# Patient Record
Sex: FEMALE | Race: WHITE | NOT HISPANIC OR LATINO | Employment: FULL TIME | URBAN - METROPOLITAN AREA
[De-identification: names, ages, dates, MRNs, and addresses within clinical notes are randomized per-mention and may not be internally consistent; named-entity substitution may affect disease eponyms.]

---

## 2023-01-23 ENCOUNTER — OFFICE VISIT (OUTPATIENT)
Dept: INTERNAL MEDICINE | Facility: CLINIC | Age: 53
End: 2023-01-23
Payer: COMMERCIAL

## 2023-01-23 VITALS
BODY MASS INDEX: 32.76 KG/M2 | HEART RATE: 80 BPM | TEMPERATURE: 97.5 F | OXYGEN SATURATION: 99 % | RESPIRATION RATE: 16 BRPM | DIASTOLIC BLOOD PRESSURE: 90 MMHG | WEIGHT: 178 LBS | HEIGHT: 62 IN | SYSTOLIC BLOOD PRESSURE: 130 MMHG

## 2023-01-23 DIAGNOSIS — E66.09 CLASS 1 OBESITY DUE TO EXCESS CALORIES WITHOUT SERIOUS COMORBIDITY WITH BODY MASS INDEX (BMI) OF 33.0 TO 33.9 IN ADULT: ICD-10-CM

## 2023-01-23 DIAGNOSIS — Z13.228 SCREENING FOR ENDOCRINE, NUTRITIONAL, METABOLIC AND IMMUNITY DISORDER: ICD-10-CM

## 2023-01-23 DIAGNOSIS — E66.811 CLASS 1 OBESITY DUE TO EXCESS CALORIES WITHOUT SERIOUS COMORBIDITY WITH BODY MASS INDEX (BMI) OF 33.0 TO 33.9 IN ADULT: ICD-10-CM

## 2023-01-23 DIAGNOSIS — N95.1 MENOPAUSAL SYNDROME (HOT FLASHES): ICD-10-CM

## 2023-01-23 DIAGNOSIS — Z13.220 SCREENING FOR HYPERLIPIDEMIA: ICD-10-CM

## 2023-01-23 DIAGNOSIS — Z13.29 SCREENING FOR ENDOCRINE, NUTRITIONAL, METABOLIC AND IMMUNITY DISORDER: ICD-10-CM

## 2023-01-23 DIAGNOSIS — R53.83 FATIGUE, UNSPECIFIED TYPE: ICD-10-CM

## 2023-01-23 DIAGNOSIS — Z76.89 ENCOUNTER TO ESTABLISH CARE: Primary | ICD-10-CM

## 2023-01-23 DIAGNOSIS — Z86.69 HISTORY OF MIGRAINE HEADACHES: ICD-10-CM

## 2023-01-23 DIAGNOSIS — Z80.3 FAMILY HISTORY OF MALIGNANT NEOPLASM OF BREAST: ICD-10-CM

## 2023-01-23 DIAGNOSIS — Z13.21 SCREENING FOR ENDOCRINE, NUTRITIONAL, METABOLIC AND IMMUNITY DISORDER: ICD-10-CM

## 2023-01-23 DIAGNOSIS — Z80.0 FAMILY HISTORY OF COLON CANCER: ICD-10-CM

## 2023-01-23 DIAGNOSIS — Z13.0 SCREENING FOR ENDOCRINE, NUTRITIONAL, METABOLIC AND IMMUNITY DISORDER: ICD-10-CM

## 2023-01-23 DIAGNOSIS — Z12.11 SCREEN FOR COLON CANCER: ICD-10-CM

## 2023-01-23 DIAGNOSIS — Z12.31 VISIT FOR SCREENING MAMMOGRAM: ICD-10-CM

## 2023-01-23 LAB
ALBUMIN SERPL BCG-MCNC: 4.8 G/DL (ref 3.5–5.2)
ALP SERPL-CCNC: 92 U/L (ref 35–104)
ALT SERPL W P-5'-P-CCNC: 33 U/L (ref 10–35)
ANION GAP SERPL CALCULATED.3IONS-SCNC: 18 MMOL/L (ref 7–15)
AST SERPL W P-5'-P-CCNC: 27 U/L (ref 10–35)
BASOPHILS # BLD AUTO: 0.1 10E3/UL (ref 0–0.2)
BASOPHILS NFR BLD AUTO: 1 %
BILIRUB SERPL-MCNC: 0.2 MG/DL
BUN SERPL-MCNC: 8.9 MG/DL (ref 6–20)
CALCIUM SERPL-MCNC: 9.8 MG/DL (ref 8.6–10)
CHLORIDE SERPL-SCNC: 107 MMOL/L (ref 98–107)
CHOLEST SERPL-MCNC: 213 MG/DL
CREAT SERPL-MCNC: 0.67 MG/DL (ref 0.51–0.95)
CRP SERPL-MCNC: <3 MG/L
DEPRECATED CALCIDIOL+CALCIFEROL SERPL-MC: 13 UG/L (ref 20–75)
DEPRECATED HCO3 PLAS-SCNC: 20 MMOL/L (ref 22–29)
EOSINOPHIL # BLD AUTO: 0.1 10E3/UL (ref 0–0.7)
EOSINOPHIL NFR BLD AUTO: 1 %
ERYTHROCYTE [DISTWIDTH] IN BLOOD BY AUTOMATED COUNT: 12.2 % (ref 10–15)
ERYTHROCYTE [SEDIMENTATION RATE] IN BLOOD BY WESTERGREN METHOD: 6 MM/HR (ref 0–20)
ESTRADIOL SERPL-MCNC: <5 PG/ML
GFR SERPL CREATININE-BSD FRML MDRD: >90 ML/MIN/1.73M2
GLUCOSE SERPL-MCNC: 94 MG/DL (ref 70–99)
HBA1C MFR BLD: 5.2 % (ref 0–5.6)
HCT VFR BLD AUTO: 42.8 % (ref 35–47)
HDLC SERPL-MCNC: 56 MG/DL
HGB BLD-MCNC: 14.6 G/DL (ref 11.7–15.7)
IMM GRANULOCYTES # BLD: 0 10E3/UL
IMM GRANULOCYTES NFR BLD: 0 %
INSULIN SERPL-ACNC: 7.3 UU/ML (ref 2.6–24.9)
LDLC SERPL CALC-MCNC: 135 MG/DL
LYMPHOCYTES # BLD AUTO: 1.9 10E3/UL (ref 0.8–5.3)
LYMPHOCYTES NFR BLD AUTO: 31 %
MCH RBC QN AUTO: 30.4 PG (ref 26.5–33)
MCHC RBC AUTO-ENTMCNC: 34.1 G/DL (ref 31.5–36.5)
MCV RBC AUTO: 89 FL (ref 78–100)
MONOCYTES # BLD AUTO: 0.6 10E3/UL (ref 0–1.3)
MONOCYTES NFR BLD AUTO: 9 %
NEUTROPHILS # BLD AUTO: 3.6 10E3/UL (ref 1.6–8.3)
NEUTROPHILS NFR BLD AUTO: 58 %
NONHDLC SERPL-MCNC: 157 MG/DL
PLATELET # BLD AUTO: 267 10E3/UL (ref 150–450)
POTASSIUM SERPL-SCNC: 4.3 MMOL/L (ref 3.4–5.3)
PROGEST SERPL-MCNC: 0.1 NG/ML
PROT SERPL-MCNC: 7.7 G/DL (ref 6.4–8.3)
RBC # BLD AUTO: 4.81 10E6/UL (ref 3.8–5.2)
SHBG SERPL-SCNC: 46 NMOL/L (ref 30–135)
SODIUM SERPL-SCNC: 145 MMOL/L (ref 136–145)
T3 SERPL-MCNC: 105 NG/DL (ref 85–202)
TRIGL SERPL-MCNC: 109 MG/DL
TSH SERPL DL<=0.005 MIU/L-ACNC: 3.09 UIU/ML (ref 0.3–4.2)
WBC # BLD AUTO: 6.2 10E3/UL (ref 4–11)

## 2023-01-23 PROCEDURE — 86140 C-REACTIVE PROTEIN: CPT | Performed by: NURSE PRACTITIONER

## 2023-01-23 PROCEDURE — 80050 GENERAL HEALTH PANEL: CPT | Performed by: NURSE PRACTITIONER

## 2023-01-23 PROCEDURE — 36415 COLL VENOUS BLD VENIPUNCTURE: CPT | Performed by: NURSE PRACTITIONER

## 2023-01-23 PROCEDURE — 86376 MICROSOMAL ANTIBODY EACH: CPT | Performed by: NURSE PRACTITIONER

## 2023-01-23 PROCEDURE — 83036 HEMOGLOBIN GLYCOSYLATED A1C: CPT | Performed by: NURSE PRACTITIONER

## 2023-01-23 PROCEDURE — 83525 ASSAY OF INSULIN: CPT | Performed by: NURSE PRACTITIONER

## 2023-01-23 PROCEDURE — 99386 PREV VISIT NEW AGE 40-64: CPT | Performed by: NURSE PRACTITIONER

## 2023-01-23 PROCEDURE — 84480 ASSAY TRIIODOTHYRONINE (T3): CPT | Performed by: NURSE PRACTITIONER

## 2023-01-23 PROCEDURE — 84144 ASSAY OF PROGESTERONE: CPT | Performed by: NURSE PRACTITIONER

## 2023-01-23 PROCEDURE — 84270 ASSAY OF SEX HORMONE GLOBUL: CPT | Performed by: NURSE PRACTITIONER

## 2023-01-23 PROCEDURE — 82670 ASSAY OF TOTAL ESTRADIOL: CPT | Performed by: NURSE PRACTITIONER

## 2023-01-23 PROCEDURE — 82306 VITAMIN D 25 HYDROXY: CPT | Performed by: NURSE PRACTITIONER

## 2023-01-23 PROCEDURE — 80061 LIPID PANEL: CPT | Performed by: NURSE PRACTITIONER

## 2023-01-23 PROCEDURE — 84403 ASSAY OF TOTAL TESTOSTERONE: CPT | Performed by: NURSE PRACTITIONER

## 2023-01-23 PROCEDURE — 99214 OFFICE O/P EST MOD 30 MIN: CPT | Mod: 25 | Performed by: NURSE PRACTITIONER

## 2023-01-23 PROCEDURE — 85652 RBC SED RATE AUTOMATED: CPT | Performed by: NURSE PRACTITIONER

## 2023-01-23 RX ORDER — DIPHENHYDRAMINE HCL 25 MG
25 CAPSULE ORAL
COMMUNITY

## 2023-01-23 RX ORDER — SUMATRIPTAN 100 MG/1
100 TABLET, FILM COATED ORAL
COMMUNITY
Start: 2023-01-23 | End: 2023-06-15

## 2023-01-23 RX ORDER — MELATON/B.COHOSH/VALERIAN/HOPS 3 MG-40 MG
CAPSULE ORAL
COMMUNITY
Start: 2023-01-23

## 2023-01-23 ASSESSMENT — ENCOUNTER SYMPTOMS
HEARTBURN: 0
CHILLS: 0
NERVOUS/ANXIOUS: 0
DYSURIA: 0
COUGH: 0
JOINT SWELLING: 0
HEADACHES: 1
DIZZINESS: 0
ARTHRALGIAS: 0
EYE PAIN: 0
HEMATOCHEZIA: 0
PALPITATIONS: 0
NAUSEA: 0
ABDOMINAL PAIN: 0
WEAKNESS: 0
PARESTHESIAS: 0
FEVER: 0
DIARRHEA: 0
FREQUENCY: 0
MYALGIAS: 0
SORE THROAT: 0
CONSTIPATION: 0
SHORTNESS OF BREATH: 0
HEMATURIA: 0

## 2023-01-23 NOTE — PATIENT INSTRUCTIONS
Your labs are processing this time, I released results on [employer] once they are back.    To schedule your colonoscopy call [phone].    To schedule your mammogram call [phone].    Try the amitriptyline at bedtime, 25 mg for 7 nights if tolerating this okay increase to 50 mg at bedtime.  This should help prevent migraine headaches.  Continue to use the Imitrex as needed.  Please let me know if the amitriptyline is not super helpful.    Start the cardio metabolic diet as attached.    Continue your current exercise regime.    For the genetic testing for breast and colon cancer scheduling will reach out to you to help get you scheduled for this appointment.    I will plan on seeing you back in 3 months for follow-up, before then if anything comes up.

## 2023-01-23 NOTE — PROGRESS NOTES
SUBJECTIVE:   CC: Chiquita is an 52 year old who presents for preventive health visit.   Patient has been advised of split billing requirements and indicates understanding: Yes  The patient presents today to establish care and for her annual wellness visit.    She reports that her last pap smear was normal, so was her mammogram, she will sign a release to have her records sent to us for review. Both occurred in 2022.    She is due for a colonoscopy, will order this.    She is fasted, will check her labs today.    She reports a past surgical history of 2 clubfoot surgeries on the left, , and a nasal surgery in the past.    She reports moving here from [facility] for a new position at [employer] she works as their .    She was on her mother is 74 years old, has a history of hypertension and breast cancer.  Her father is 73 years old has a history of type 2 diabetes.  She does have a paternal twin sister who also has diabetes and multiple other medical issues.    She reports that her grandmother has a history of breast and colon cancer.  She has never had genetic testing done, we will send her for evaluation of this.    She is  with a son who is age 24, daughter who is 22 both are doing well.    She reports that she exercises 5 to 6 days/week, 35 to 40 minutes at a time, on her Peloton bike or elliptical.    She reports that she mostly tries to eat a high-protein low-carb diet.  Discussed the importance of getting enough calories then, fruits and veggies.  We will send her home with the cardio metabolic diet options.    She reports chronic migraine headaches starting at age 17.  She reports that she still is getting them 3-4 migraine days a week, she has tried Topamax in the past has not been helpful as a preventative.  She reports that Imitrex has been helpful as an abortive therapy.    She denies any history of obstructive sleep apnea.  She denies any tobacco, drug, alcohol  use.    She continues to get hot flashes, has not had a period for years.  She is taking Estroven over-the-counter to help.  We will check hormones today.    She also reports feeling very tired at times, will check labs today. Even with her amount of exercise, she is not able to loose weight.     Healthy Habits:     Getting at least 3 servings of Calcium per day:  NO    Bi-annual eye exam:  Yes    Dental care twice a year:  Yes    Sleep apnea or symptoms of sleep apnea:  None    Diet:  Carbohydrate counting    Frequency of exercise:  4-5 days/week    Duration of exercise:  30-45 minutes    Taking medications regularly:  Yes    Medication side effects:  None    PHQ-2 Total Score: 0    Additional concerns today:  Yes    Today's PHQ-2 Score:   PHQ-2 ( 1999 Pfizer) 1/23/2023   Q1: Little interest or pleasure in doing things 0   Q2: Feeling down, depressed or hopeless 0   PHQ-2 Score 0   Q1: Little interest or pleasure in doing things Not at all   Q2: Feeling down, depressed or hopeless Not at all   PHQ-2 Score 0       Have you ever done Advance Care Planning? (For example, a Health Directive, POLST, or a discussion with a medical provider or your loved ones about your wishes): No, advance care planning information given to patient to review.  Patient declined advance care planning discussion at this time.    Social History     Tobacco Use     Smoking status: Never     Smokeless tobacco: Never   Substance Use Topics     Alcohol use: Not on file     If you drink alcohol do you typically have >3 drinks per day or >7 drinks per week? No    Alcohol Use 1/23/2023   Prescreen: >3 drinks/day or >7 drinks/week? No   Prescreen: >3 drinks/day or >7 drinks/week? -       Reviewed orders with patient.  Reviewed health maintenance and updated orders accordingly - Yes  Lab work is in process    Breast Cancer Screening:    FHS-7:   Breast CA Risk Assessment (FHS-7) 1/23/2023   Did any of your first-degree relatives have breast or  ovarian cancer? Yes   Did any of your relatives have bilateral breast cancer? Unknown   Did any man in your family have breast cancer? No   Did any woman in your family have breast and ovarian cancer? No   Did any woman in your family have breast cancer before age 50 y? No   Do you have 2 or more relatives with breast and/or ovarian cancer? Yes   Do you have 2 or more relatives with breast and/or bowel cancer? Yes       Mammogram Screening: Recommended annual mammography  Pertinent mammograms are reviewed under the imaging tab.    History of abnormal Pap smear: NO - age 30-65 PAP every 5 years with negative HPV co-testing recommended     Reviewed and updated as needed this visit by clinical staff   Tobacco  Allergies  Meds              Reviewed and updated as needed this visit by Provider                 No past medical history on file.   No past surgical history on file.    Review of Systems   Constitutional: Negative for chills and fever.   HENT: Negative for congestion, ear pain, hearing loss and sore throat.    Eyes: Negative for pain and visual disturbance.   Respiratory: Negative for cough and shortness of breath.    Cardiovascular: Negative for chest pain, palpitations and peripheral edema.   Gastrointestinal: Negative for abdominal pain, constipation, diarrhea, heartburn, hematochezia and nausea.   Genitourinary: Negative for dysuria, frequency, genital sores, hematuria, pelvic pain, urgency and vaginal bleeding.   Musculoskeletal: Negative for arthralgias, joint swelling and myalgias.   Skin: Negative for rash.   Neurological: Positive for headaches. Negative for dizziness, weakness and paresthesias.   Psychiatric/Behavioral: Negative for mood changes. The patient is not nervous/anxious.      CONSTITUTIONAL: NEGATIVE for fever, chills, change in weight  INTEGUMENTARY/SKIN: NEGATIVE for worrisome rashes, moles or lesions  EYES: NEGATIVE for vision changes or irritation  ENT: NEGATIVE for ear, mouth and  "throat problems  RESP: NEGATIVE for significant cough or SOB  BREAST: NEGATIVE for masses, tenderness or discharge  CV: NEGATIVE for chest pain, palpitations or peripheral edema  GI: NEGATIVE for nausea, abdominal pain, heartburn, or change in bowel habits  : NEGATIVE for unusual urinary or vaginal symptoms. No vaginal bleeding.  MUSCULOSKELETAL: NEGATIVE for significant arthralgias or myalgia  NEURO: NEGATIVE for weakness, dizziness or paresthesias  PSYCHIATRIC: NEGATIVE for changes in mood or affect      OBJECTIVE:   BP (!) 130/90   Pulse 80   Temp 97.5  F (36.4  C)   Resp 16   Ht 1.562 m (5' 1.5\")   Wt 80.7 kg (178 lb)   LMP 10/04/2022 (Approximate)   SpO2 99%   BMI 33.09 kg/m    Physical Exam  GENERAL APPEARANCE: healthy, alert and no distress  EYES: Eyes grossly normal to inspection, PERRL and conjunctivae and sclerae normal  HENT: ear canals and TM's normal, nose and mouth without ulcers or lesions, oropharynx clear and oral mucous membranes moist  NECK: no adenopathy, no asymmetry, masses, or scars and thyroid normal to palpation  RESP: lungs clear to auscultation - no rales, rhonchi or wheezes  CV: regular rate and rhythm, normal S1 S2, no S3 or S4, no murmur, click or rub, no peripheral edema and peripheral pulses strong  ABDOMEN: soft, nontender, no hepatosplenomegaly, no masses and bowel sounds normal  MS: no musculoskeletal defects are noted and gait is age appropriate without ataxia  SKIN: no suspicious lesions or rashes  NEURO: Normal strength and tone, sensory exam grossly normal, mentation intact and speech normal  PSYCH: mentation appears normal and affect normal/bright    Diagnostic Test Results:  Labs reviewed in Epic    ASSESSMENT/PLAN:   Chiquita was seen today for physical.    Diagnoses and all orders for this visit:    Encounter to establish care: Care established today. Fasted labs drawn. Mammogram and Colonoscopy ordered.     Class 1 obesity due to excess calories without serious " comorbidity with body mass index (BMI) of 33.0 to 33.9 in adult: BMI today was 33.09. She is exercising 30-40 minutes 5-6 days per week. She is still having trouble losing weight, will check labs. Will start on the cardiometabolic diet.   -     TSH with free T4 reflex; Future  -     T3, total; Future  -     Thyroid peroxidase antibody; Future  -     ESR: Erythrocyte sedimentation rate; Future  -     CRP, inflammation; Future    Menopausal syndrome (hot flashes): She still is having hot flashes even being on the Estroven. Will check labs today.   -     Hemoglobin A1c; Future  -     Insulin level; Future  -     TSH with free T4 reflex; Future  -     T3, total; Future  -     Thyroid peroxidase antibody; Future  -     Estradiol; Future  -     Progesterone; Future  -     Testosterone Free and Total; Future    Fatigue, unspecified type: Will check a Vitamin D level.   -     Vitamin D deficiency screening    History of migraine headaches: Abortive therapy with Imitrex, will start on Elavil as a preventative medication. Follow up in 8 weeks.   -     amitriptyline (ELAVIL) 25 MG tablet; Take 1 tablet (25 mg) by mouth At Bedtime for 7 days, THEN 2 tablets (50 mg) At Bedtime for 83 days.    Family history of malignant neoplasm of breast  -     Cancer Risk Mgmt/Cancer Genetic Counseling Referral; Future    Family history of colon cancer  -     Cancer Risk Mgmt/Cancer Genetic Counseling Referral; Future    Visit for screening mammogram  -     MA SCREENING DIGITAL BILAT - Future  (s+30); Future    Screening for endocrine, nutritional, metabolic and immunity disorder  -     CBC with platelets and differential; Future  -     Comprehensive metabolic panel (BMP + Alb, Alk Phos, ALT, AST, Total. Bili, TP); Future  -     ESR: Erythrocyte sedimentation rate; Future  -     CRP, inflammation; Future    Screen for colon cancer  -     Colonoscopy Screening  Referral; Future    Screening for hyperlipidemia  -     Lipid panel reflex  "to direct LDL Non-fasting    Other orders  -     REVIEW OF HEALTH MAINTENANCE PROTOCOL ORDERS    Patient has been advised of split billing requirements and indicates understanding: Yes      COUNSELING:  Reviewed preventive health counseling, as reflected in patient instructions  Special attention given to:        Regular exercise       Healthy diet/nutrition       Vision screening       Hearing screening      BMI:   Estimated body mass index is 33.09 kg/m  as calculated from the following:    Height as of this encounter: 1.562 m (5' 1.5\").    Weight as of this encounter: 80.7 kg (178 lb).   Weight management plan: Discussed healthy diet and exercise guidelines      She reports that she has never smoked. She has never used smokeless tobacco.    Phuong Clay CNP  [facility]  "

## 2023-01-24 LAB — THYROPEROXIDASE AB SERPL-ACNC: 35 IU/ML

## 2023-01-25 LAB
TESTOST FREE SERPL-MCNC: 0.16 NG/DL
TESTOST SERPL-MCNC: 11 NG/DL (ref 8–60)

## 2023-02-12 ENCOUNTER — HEALTH MAINTENANCE LETTER (OUTPATIENT)
Age: 53
End: 2023-02-12

## 2023-02-13 ENCOUNTER — HOSPITAL ENCOUNTER (OUTPATIENT)
Dept: MAMMOGRAPHY | Facility: CLINIC | Age: 53
Discharge: HOME | End: 2023-02-13
Attending: NURSE PRACTITIONER | Admitting: NURSE PRACTITIONER
Payer: COMMERCIAL

## 2023-02-13 DIAGNOSIS — Z12.31 VISIT FOR SCREENING MAMMOGRAM: ICD-10-CM

## 2023-02-13 PROCEDURE — 77067 SCR MAMMO BI INCL CAD: CPT

## 2023-04-19 ENCOUNTER — VIRTUAL VISIT (OUTPATIENT)
Dept: ONCOLOGY | Facility: CLINIC | Age: 53
End: 2023-04-19
Attending: NURSE PRACTITIONER
Payer: COMMERCIAL

## 2023-04-19 DIAGNOSIS — Z80.42 FAMILY HISTORY OF PROSTATE CANCER: ICD-10-CM

## 2023-04-19 DIAGNOSIS — Z80.3 FAMILY HISTORY OF MALIGNANT NEOPLASM OF BREAST: Primary | ICD-10-CM

## 2023-04-19 DIAGNOSIS — Z80.0 FAMILY HISTORY OF COLON CANCER: ICD-10-CM

## 2023-04-19 PROCEDURE — 96040 HC GENETIC COUNSELING, EACH 30 MINUTES: CPT | Mod: TEL,95 | Performed by: GENETIC COUNSELOR, MS

## 2023-04-19 NOTE — PROGRESS NOTES
Virtual Visit Details    Type of service:  Telephone Visit   Phone call duration: 33 minutes     Saba Bee MS, [facility]  Genetic Counselor  Cancer Risk Management Program

## 2023-04-19 NOTE — LETTER
4/19/2023         RE: Chiquita Baca  [address] [address]        Dear Colleague,    Thank you for referring your patient, Chiquita Baca, to the [facility]. Please see a copy of my visit note below.    Virtual Visit Details    Type of service:  Telephone Visit   Phone call duration: 33 minutes     Saba Bee MS, [facility]  Genetic Counselor  Cancer Risk Management Program    Cancer Risk Management Program Genetic Counseling Notes    4/19/2023    Referring Provider: Phuong Clay CNP    Presenting Information:   I had a telephone visit with Chiquita Baca today for genetic counseling through the Cancer Risk Management Program, in order to discuss her family history of breast, prostate, and colon cancer.  She presents today to review this history, cancer screening recommendations, and available genetic testing options.    Personal History:  Chiquita is a 52 year old female. She does not have any personal history of cancer.  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 She also reports a history of migraines (for which she takes medication) and chronic sinus infections (for which she has had a sinus surgery).  With respect to her cancer screening history, Chiquita's most recent mammogram was in February and was negative; she reports no history of previous abnormal mammograms or breast biopsies. Chiquita reports not history of abnormal Pap smears. She has been recently referred for her first screening colonoscopy, but this has not yet been completed.    With respect to her hormone-based medical history, Chiquita reports that she had her first menstrual period at age 15 and that she is currently going through the menopause process.  She states that she has been using an over-the-counter estrogen product for around a year.  She also reports that she used oral contraceptives in  the past for around 10 years.  Chiquita has her ovaries, fallopian tubes, and cervix intact.      Chiquita reports no personal history of smoking and no personal history of chronic, excessive alcohol use.  Chiquita states that she is not aware of any other personal history of any specific, potentially concerning environmental exposures with respect to cancer risk.    Family History: (Please see scanned pedigree for detailed family history information)  Chiquita reports that her mother was diagnosed with breast cancer at the age of 71, for which she underwent a lumpectomy and radiation.  Chiquita states that her maternal grandmother was diagnosed with breast cancer at age 50 and then later diagnosed with colon cancer at age 80.  Chiquita reports that her father was diagnosed with prostate cancer at age 72; she reports that this is in early stage prostate cancer that has not yet required any treatment.  Information about ancestry was collected, as specific genetic variants may be more common is certain ethnic backgrounds. Chiquita states that her her family is of general  ancestry.  There is no known Ashkenazi [employer]ism ancestry on either side of her family. There is no reported consanguinity.    Discussion:  We reviewed the features of sporadic, familial, and hereditary cancers. In looking at Chiquita's family history, it is possible that a cancer susceptibility gene mutation is present due to the presence of two generations of breast cancer in Chiquita's family, with one of these cancers being of early onset. In addition, her maternal grandmother had a history of two different cancers.    We discussed the natural history and genetics of hereditary cancer syndromes associated with breast (and other) cancer. A detailed handout regarding some of these hereditary cancer syndromes and the information we discussed was provided to Chiquita after our appointment and can be found in the after visit summary. Topics included: inheritance  pattern, cancer risks, cancer screening recommendations, and also risks, benefits and limitations of testing.    Based on her family history, Chiquita meets current National Comprehensive Cancer Network (NCCN) criteria for genetic testing of high-penetrance breast and/or ovarian cancer susceptibility genes (including BRCA1, BRCA2, CDH1, PALB2, PTEN, and TP53), because of her family history of two close relatives (her mother and maternal grandmother) with breast cancer, one of which was diagnosed with breast cancer at any early age (her maternal grandmother).      We discussed that there are additional genes besides those listed above that could cause increased risk for breast (and other) cancer. As many of these genes present with overlapping features in a family and accurate cancer risk cannot always be established based upon the pedigree analysis alone, it would be reasonable for Chiquita to consider panel genetic testing to analyze multiple genes at once.    Chiquita stated that she was interested in pursuing genetic testing through a panel of genes associated with hereditary cancer syndromes, and so we reviewed the various panel options and their potential benefits and limitations.  After this conversation, Chiquita decided that she was interested in a BRCA1/BRCA genetic analysis with an automatic reflex to the Common Hereditary Cancers genetic testing panel.    The Common Hereditary Cancers genetic testing panel through [facility] includes analysis for 47 genes associated with cancers of the breast, ovary, uterus, prostate and gastrointestinal system: APC, ERLIN, AXIN2, BARD1, BMPR1A, BRCA1, BRCA2, BRIP1, CDH1, CDK4, CDKN2A, CHEK2, CTNNA1, DICER1, EPCAM, GREM1, HOXB13, KIT, MEN1, MLH1, MSH2, MSH3, MSH6, MUTYH, NBN, NF1, NTHL1, PALB2, PDGFRA, PMS2, POLD1, POLE, PTEN,RAD50, RAD51C, RAD51D, SDHA, SDHB, SDHC, SDHD, SMAD4, SMARCA4, STK11, TP53,TSC1, TSC2, and VHL.      We discussed that many of these genes are associated with  specific hereditary cancer syndromes and published management guidelines: Hereditary Breast and Ovarian Cancer syndrome (BRCA1, BRCA2), Brown syndrome (MLH1, MSH2, MSH6, PMS2, EPCAM), Familial Adenomatous Polyposis (APC), Hereditary Diffuse Gastric Cancer (CDH1), Familial Atypical Multiple Mole Melanoma syndrome (CDK4, CDKN2A), Multiple Endocrine Neoplasia type 1 (MEN1), Juvenile Polyposis syndrome (BMPR1A, SMAD4), Cowden syndrome (PTEN), Li Fraumeni syndrome (TP53), Hereditary Paraganglioma and Pheochromocytoma syndrome (SDHA, SDHB, SDHC, SDHD), Peutz-Jeghers syndrome (STK11), MUTYH Associated Polyposis (MUTYH), Tuberous sclerosis complex (TSC1, TSC2), Von Hippel-Lindau disease (VHL), and Neurofibromatosis type 1 (NF1). The ERLIN, AXIN2, BRIP1, CHEK2, GREM1, MSH3, NBN, NTHL1, PALB2, POLD1, POLE, RAD51C, and RAD51D genes are associated with increased cancer risk and have published management guidelines for certain cancers. The remaining genes (BARD1, CTNNA1, DICER1, HOXB13, KIT, PDGFRA, RAD50, and SMARCA4) are associated with increased cancer risk and may allow us to make medical recommendations when mutations are identified.     Medical Management: For Chiquita, we reviewed that the information from genetic testing may determine:  additional cancer screening for which Chiquita may qualify (eg. mammogram and breast MRI, more frequent colonoscopies, more frequent dermatologic exams, etc.),  options for risk-reducing surgeries Chiquita could consider, if indicated (eg. bilateral mastectomy, surgery to remove her ovaries and/or uterus, etc.),    and targeted therapies for Chiquita, if she were to develop certain cancers in the future (eg. lumpectomy versus mastectomy,  immunotherapy for individuals with Brown syndrome, PARP inhibitors for HBOC syndrome, etc.).     These recommendations and possible targeted therapies will be discussed in detail once genetic testing is completed.     Plan:  1) Today, Chiquita decided to proceed  with a BRCA1/BRCA2 analysis with an automatic reflex to the Common Hereditary Cancers genetic testing panel.  Therefore, consent was reviewed and verbally obtained for this testing.    2) We reviewed the options for sample collection.  Chiquita  plans to have her blood drawn at her local [facility] lab.Test results are expected to be available within 4-6 weeks.  3) Chiquita will either have a telephone visit or video visit to discuss the results.  Additional recommendations about screening will be made at that time.    Saba Bee MS, [facility]  Genetic Counselor  Cancer Risk Management Program     Time spent over the phone: 33 minutes

## 2023-04-19 NOTE — LETTER
Cancer Risk Management  Program Locations    [facility]  [facility]  [facility]  [facility]  [facility]  Mailing Address  Cancer Risk Management Program  [address]  [place] [address]    New patient appointments  [phone]  April 21, 2023    Chiquita Flowerses  [address] [facility] [id number]      Dear Chiquita,    It was a pleasure talking with you via your virtual genetic counseling visit on 4-.  Below is a copy of the progress note from that recent visit through the Cancer Risk Management Program.  If you have any additional questions, please feel free to contact me.    Cancer Risk Management Program Genetic Counseling Notes    4/19/2023    Referring Provider: Phuong Clay CNP    Presenting Information:   I had a telephone visit with Chiquita Sherley Baca today for genetic counseling through the Cancer Risk Management Program, in order to discuss her family history of breast, prostate, and colon cancer.  She presents today to review this history, cancer screening recommendations, and available genetic testing options.    Personal History:  Chiquita is a 52 year old female. She does not have any personal history of cancer.  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 She also reports a history of migraines (for which she takes medication) and chronic sinus infections (for which she has had a sinus surgery).  With respect to her cancer screening history, Chiquita's most recent mammogram was in February and was negative; she reports no history of previous abnormal mammograms or breast biopsies. Chiquita reports not history of abnormal Pap smears. She has been recently referred for her first screening colonoscopy, but this has not yet been  completed.    With respect to her hormone-based medical history, Chiquita reports that she had her first menstrual period at age 15 and that she is currently going through the menopause process.  She states that she has been using an over-the-counter estrogen product for around a year.  She also reports that she used oral contraceptives in the past for around 10 years.  Chiquita has her ovaries, fallopian tubes, and cervix intact.      Chiquita reports no personal history of smoking and no personal history of chronic, excessive alcohol use.  Chiquita states that she is not aware of any other personal history of any specific, potentially concerning environmental exposures with respect to cancer risk.    Family History: (Please see scanned pedigree for detailed family history information)    Chiquita reports that her mother was diagnosed with breast cancer at the age of 71, for which she underwent a lumpectomy and radiation.    Chiquita states that her maternal grandmother was diagnosed with breast cancer at age 50 and then later diagnosed with colon cancer at age 80.    Chiquita reports that her father was diagnosed with prostate cancer at age 72; she reports that this is in early stage prostate cancer that has not yet required any treatment.    Information about ancestry was collected, as specific genetic variants may be more common is certain ethnic backgrounds. Chiquita states that her her family is of general  ancestry.  There is no known Ashkenazi Mandaen ancestry on either side of her family. There is no reported consanguinity.    Discussion:    We reviewed the features of sporadic, familial, and hereditary cancers. In looking at Chiquita's family history, it is possible that a cancer susceptibility gene mutation is present due to the presence of two generations of breast cancer in Chiquita's family, with one of these cancers being of early onset. In addition, her maternal grandmother had a history of two different  cancers.      We discussed the natural history and genetics of hereditary cancer syndromes associated with breast (and other) cancer. A detailed handout regarding some of these hereditary cancer syndromes and the information we discussed was provided to Chiquita after our appointment and can be found in the after visit summary. Topics included: inheritance pattern, cancer risks, cancer screening recommendations, and also risks, benefits and limitations of testing.      Based on her family history, Chiquita meets current National Comprehensive Cancer Network (NCCN) criteria for genetic testing of high-penetrance breast and/or ovarian cancer susceptibility genes (including BRCA1, BRCA2, CDH1, PALB2, PTEN, and TP53), because of her family history of two close relatives (her mother and maternal grandmother) with breast cancer, one of which was diagnosed with breast cancer at any early age (her maternal grandmother).        We discussed that there are additional genes besides those listed above that could cause increased risk for breast (and other) cancer. As many of these genes present with overlapping features in a family and accurate cancer risk cannot always be established based upon the pedigree analysis alone, it would be reasonable for Chiquita to consider panel genetic testing to analyze multiple genes at once.      Chiquita stated that she was interested in pursuing genetic testing through a panel of genes associated with hereditary cancer syndromes, and so we reviewed the various panel options and their potential benefits and limitations.  After this conversation, Chiquita decided that she was interested in a BRCA1/BRCA genetic analysis with an automatic reflex to the Common Hereditary Cancers genetic testing panel.      The Common Hereditary Cancers genetic testing panel through [facility] includes analysis for 47 genes associated with cancers of the breast, ovary, uterus, prostate and gastrointestinal system: APC, ERLIN,  AXIN2, BARD1, BMPR1A, BRCA1, BRCA2, BRIP1, CDH1, CDK4, CDKN2A, CHEK2, CTNNA1, DICER1, EPCAM, GREM1, HOXB13, KIT, MEN1, MLH1, MSH2, MSH3, MSH6, MUTYH, NBN, NF1, NTHL1, PALB2, PDGFRA, PMS2, POLD1, POLE, PTEN,RAD50, RAD51C, RAD51D, SDHA, SDHB, SDHC, SDHD, SMAD4, SMARCA4, STK11, TP53,TSC1, TSC2, and VHL.        We discussed that many of these genes are associated with specific hereditary cancer syndromes and published management guidelines: Hereditary Breast and Ovarian Cancer syndrome (BRCA1, BRCA2), Brown syndrome (MLH1, MSH2, MSH6, PMS2, EPCAM), Familial Adenomatous Polyposis (APC), Hereditary Diffuse Gastric Cancer (CDH1), Familial Atypical Multiple Mole Melanoma syndrome (CDK4, CDKN2A), Multiple Endocrine Neoplasia type 1 (MEN1), Juvenile Polyposis syndrome (BMPR1A, SMAD4), Cowden syndrome (PTEN), Li Fraumeni syndrome (TP53), Hereditary Paraganglioma and Pheochromocytoma syndrome (SDHA, SDHB, SDHC, SDHD), Peutz-Jeghers syndrome (STK11), MUTYH Associated Polyposis (MUTYH), Tuberous sclerosis complex (TSC1, TSC2), Von Hippel-Lindau disease (VHL), and Neurofibromatosis type 1 (NF1). The ERLIN, AXIN2, BRIP1, CHEK2, GREM1, MSH3, NBN, NTHL1, PALB2, POLD1, POLE, RAD51C, and RAD51D genes are associated with increased cancer risk and have published management guidelines for certain cancers. The remaining genes (BARD1, CTNNA1, DICER1, HOXB13, KIT, PDGFRA, RAD50, and SMARCA4) are associated with increased cancer risk and may allow us to make medical recommendations when mutations are identified.       Medical Management: For Chiquita, we reviewed that the information from genetic testing may determine:    additional cancer screening for which Chiquita may qualify (eg. mammogram and breast MRI, more frequent colonoscopies, more frequent dermatologic exams, etc.),    options for risk-reducing surgeries Chiquita could consider, if indicated (eg. bilateral mastectomy, surgery to remove her ovaries and/or uterus, etc.***),      and  targeted therapies for Chiquita, if she were to develop certain cancers in the future (eg. lumpectomy versus mastectomy,  immunotherapy for individuals with Brown syndrome, PARP inhibitors for HBOC syndrome, etc.).       These recommendations and possible targeted therapies will be discussed in detail once genetic testing is completed.     Plan:  1) Today, Chiquita decided to proceed with a BRCA1/BRCA2 analysis with an automatic reflex to the Common Hereditary Cancers genetic testing panel.  Therefore, consent was reviewed and verbally obtained for this testing.    2) We reviewed the options for sample collection.  Chiquita  plans to have her blood drawn at her local [facility] lab.Test results are expected to be available within 4-6 weeks.  3) Chiquita will either have a telephone visit or video visit to discuss the results.  Additional recommendations about screening will be made at that time.    Saba Bee MS, [facility]  Genetic Counselor  Cancer Risk Management Program     Time spent over the phone: 33 minutes

## 2023-04-19 NOTE — NURSING NOTE
Is the patient currently in the state of [place]? YES    Visit mode:TELEPHONE    If the visit is dropped, the patient can be reconnected by: TELEPHONE VISIT: Phone number: [phone]    Will anyone else be joining the visit? NO      How would you like to obtain your AVS? MyChart    Are changes needed to the allergy or medication list? NO    Reason for visit: Telephone      Juanis CALIX

## 2023-04-20 NOTE — PATIENT INSTRUCTIONS
Assessing Cancer Risk  Cancer is a common diagnosis which impacts many families.  Individuals may develop cancer due to environmental factors (such as exposures and lifestyle), aging, genetic predisposition, or a combination of these factors.      Only about 5-10% of cancers are thought to be due to an inherited cancer susceptibility gene.    These families often have:  Several people with the same or related types of cancer  Cancers diagnosed at a young age (before age 50)  Individuals with more than one primary cancer  Multiple generations of the family affected with cancer    Comprehensive Breast and Gynecologic Cancer Panel  We each inherit two copies of every gene in our bodies: one from our mother, and one from our father. Each gene has a specific job to do.  When a gene has a mistake or  mutation  in it, it does not work like it should.     Some people may be candidates for genetic testing of more than one gene.  For these families, genetic testing using a cancer panel may be offered. These panels will test different genes at once known to increase the risk for breast, ovarian, uterine, and/or other cancers.    This handout will review common hereditary breast and gynecologic cancer syndromes. The genes that will be discussed in this handout are: ERLIN, BRCA1, BRCA2, BRIP1, CDH1, CHEK2, MLH1, MSH2, MSH6, PMS2, EPCAM, PTEN, PALB2, RAD51C, RAD51D, and TP53.    The purpose of this handout is to serve as a brief summary of the breast and gynecologic cancer risk genes that have published clinical management guidelines for individuals who are found to carry a mutation. Inheriting a mutation does not mean a person will develop cancer, but it does significantly increase their risk above the general population risk.     ______________________________________________________________________________    Hereditary Breast and Ovarian Cancer Syndrome (BRCA1 and BRCA2)  A single mutation in one of the copies of BRCA1 or  BRCA2 increases the risk for breast and ovarian cancer, among others.  The risk for pancreatic cancer and melanoma may also be slightly increased in some families.  The chart below shows the chance that someone with a BRCA mutation would develop cancer in his or her lifetime1,2,3,4.       Lifetime Cancer Risks    General Population BRCA1  BRCA2   Breast  12% >60% >60%   Ovarian  1-2% 39-58% 13-29%   Prostate 12% 7-26% 19-61%   Male Breast 0.1% 0.2-1.2% 1.8-7.1%   Pancreas 1-2% Up to 5% 5-10%     A person s ethnic background is also important to consider, as individuals of Ashkenazi Shinto ancestry have a higher chance of having a BRCA gene mutation.  There are three BRCA mutations that occur more frequently in this population.      Brown Syndrome (MLH1, MSH2, MSH6, PMS2, and EPCAM)  Currently five genes are known to cause Brown Syndrome: MLH1, MSH2, MSH6, PMS2, and EPCAM.  A single mutation in one of the Borwn Syndrome genes increases the risk for colon, endometrial, ovarian, and stomach cancers.  Other cancers that occur less commonly in Brown Syndrome include urinary tract, skin, and brain cancers.  The chart below shows the chance that a person with Brown syndrome would develop cancer in his or her lifetime5.      Lifetime Cancer Risks    General Population Brown Syndrome   Colon 5% 10-61%   Endometrial 3% 13-57%   Ovarian 1-2% 1-38%   Stomach <1% 1-9%   *Cancer risk varies depending on Brown syndrome gene found      Cowden Syndrome (PTEN)  Cowden syndrome is a hereditary condition that increases the risk for breast, thyroid, endometrial, colon, and kidney cancer.  Cowden syndrome is caused by a mutation in the PTEN gene.  A single mutation in one of the copies of PTEN causes Cowden syndrome and increases cancer risk.  The chart below shows the chance that someone with a PTEN mutation would develop cancer in their lifetime6,7.  Other benign features seen in some individuals with Cowden syndrome include benign  skin lesions (facial papules, keratoses, lipomas), learning disability, autism, thyroid nodules, colon polyps, and larger head size.     Lifetime Cancer Risks    General Population Cowden   Breast 12% 40-60%*   Thyroid 1% Up to 38%   Renal 1-2% Up to 35%   Endometrial 3% Up to 28%   Colon 5% Up to 9%   Melanoma 2-3% Up to 6%   *Emerging data suggests the risk for breast cancer could be greater than 60%               Li-Fraumeni Syndrome (TP53)  Li-Fraumeni Syndrome (LFS) is a cancer predisposition syndrome caused by a mutation in the TP53 gene. A single mutation in one of the copies of TP53 increases the risk for multiple cancers. Individuals with LFS are at an increased risk for developing cancer at a young age. The lifetime risk for development of a LFS-associated cancer is 50% by age 30 and 90% by age 60.   Core Cancers: Sarcomas, Breast, Brain, Lung, Leukemias/Lymphomas, Adrenocortical carcinomas  Other Cancers: Gastrointestinal, Thyroid, Skin, Genitourinary       Hereditary Diffuse Gastric Cancer (CDH1)  Currently, one gene is known to cause hereditary diffuse gastric cancer (HDGC): CDH1.  Individuals with HDGC are at increased risk for diffuse gastric cancer and lobular breast cancer. Of people diagnosed with HDGC, 30-50% have a mutation in the CDH1 gene.  This suggests there are likely other genes that may cause HDGC that have not been identified yet.      Lifetime Cancer Risks    General Population HDGC   Diffuse Gastric  <1% ~80%   Breast 12% 41-60%       Additional Genes    ERLIN  ERLIN is a moderate-risk breast cancer gene. Women who have a mutation in ERLIN can have between a 2-4 fold increased risk for breast cancer compared to the general population8. ERLIN mutations have also been associated with increased risk for pancreatic cancer between 5-10%9. Individuals who inherit two ERLIN mutations have a condition called ataxia-telangiectasia (AT).  This rare autosomal recessive condition affects the nervous system  and immune system, and is associated with progressive cerebellar ataxia beginning in childhood. Individuals with ataxia-telangiectasia often have a weakened immune system and have an increased risk for childhood cancers.    PALB2  Mutations in PALB2 have been shown to increase the risk of breast cancer up to 41-60% in some families; where individuals fall within this risk range is dependent upon family [id number]. PALB2 mutations have also been associated with increased risk for pancreatic cancer between 5-10%.  Individuals who inherit two PALB2 mutations--one from their mother and one from their father--have a condition called Fanconi Anemia.  This rare autosomal recessive condition is associated with short stature, developmental delay, bone marrow failure, and increased risk for childhood cancers.    CHEK2   CHEK2 is a moderate-risk breast cancer gene.  Women who have a mutation in CHEK2 have around a 2-4 fold increased risk for breast cancer compared to the general population, and this risk may be higher depending upon family history.11,12,13 The risk of colon cancer may be twice as high as the general population risk of colon cancer of 5%. Mutations in CHEK2 have also been shown to increase the risk of other cancers, including prostate, however these cancer risks are currently not well understood.    BRIP1, RAD51C and RAD51D  Mutations in RAD51C and RAD51D have been shown to increase the risk of ovarian cancer and breast cancer 14,. Mutations in BRIP1 have been shown to increase the risk of ovarian cancer and possibly female breast cancer 15 .       Lifetime Cancer Risk    General Population        BRIP1   RAD51C  RAD51D   Breast 12% Not well defined 20-40% 20-40%   Ovarian 1-2% 5-15% 10-15% 10-20%     ______________________________________________________________  Inheritance  All of the cancer syndromes reviewed above are inherited in an autosomal dominant pattern.  This means that if a parent has a mutation,  each of their children will have a 50% chance of inheriting that same mutation. Therefore, each child --male or female-- would have a 50% chance of being at increased risk for developing cancer.    Image obtained from Genetics Home Reference, 2013     Mutations in some genes can occur de kehinde, which means that a person s mutation occurred for the first time in them and was not inherited from a parent.  Now that they have the mutation, however, it can be passed on to future generations.    Genetic Testing  Genetic testing involves a blood test and will look for any harmful mutations that are associated with increased cancer risk.  If possible, it is recommended that the person(s) who has had cancer be tested before other family members.  That person will give us the most useful information about whether or not a specific gene is associated with the cancer in the family.    Results  There are three possible results of genetic testing:  Positive--a harmful mutation was identified in one or more of the genes  Negative--no mutations were identified in any of the genes tested  Variant of unknown significance--a variation in one of the genes was identified, but it is unclear how this impacts cancer risk in the family    Advantages and Disadvantages   There are advantages and disadvantages to genetic testing.    Advantages  May clarify your cancer risk  Can help you make medical decisions  May explain the cancers in your family  May give useful information to your family members (if you share your results)    Disadvantages  Possible negative emotional impact of learning about inherited cancer risk  Uncertainty in interpreting a negative test result in some situations  Possible genetic discrimination concerns (see below)    Genetic Information Nondiscrimination Act (KELSI)  The Genetic Information Nondiscrimination Act of 2008 (KELSI) is a federal law that protects individuals from health insurance or employment discrimination  based on a genetic test result alone (with some exceptions, including employers with fewer than 15 employees, and ).  Although rare, KELSI  does not cover discrimination protections in terms of life insurance, long term care, or disability insurances.  Visit the National [employer] Research [place] website to learn more.    Reducing Cancer Risk  All of the genes described in this handout have nationally recognized cancer screening guidelines that would be recommended for individuals who test positive.  In addition to increased cancer screening, surgeries may be offered or recommended to reduce cancer risk.  Recommendations are based upon an individual s genetic test result as well as their personal and family history of cancer.    Questions to Think About Regarding Genetic Testing:  What effect will the test result have on me and my relationship with my family members if I have an inherited gene mutation?  If I don t have a gene mutation?  Should I share my test results, and how will my family react to this news, which may also affect them?  Are my children ready to learn new information that may one day affect their own health?    Hereditary Cancer Resources    FORCE: Facing Our Risk of Cancer Empowered facingourrisk.org   Bright Pink bebrightpink.org   Li-Fraumeni Syndrome Association lfsassociation.org   PTEN World PTENworld.com   No stomach for cancer, Inc. nostomachforcancer.org   Stomach cancer relief network Scrnet.org   Collaborative Group of the Americas on Inherited Colorectal Cancer (CGA) cgaicc.com    Cancer Care cancercare.org   American Cancer Society (ACS) cancer.org   National Cancer [place] (NCI) cancer.gov     Please call us if you have any questions or concerns.   Cancer Risk Management Program [phone] ([phone])  Greg Phillips, [place] [facility] [place]  [phone]  Mandy Hinkle, MS, [facility] [place] [phone]  Saba Bee, MS, [facility] [place]  [phone]  Flora Delgado, MS, [facility] [place]  [phone]  Peri Phelan,  MS, [facility] [place]  [phone]  Maxine Wells, MS, [facility] [place] [phone]  Julia Benites, MS, [facility] [place] [phone]    References  Yumiko Gupta PDP, Lori S, Emi MENDEZ, Lorin JE, Kaya JL, Shorty N, Abida H, Bianca O, Tay A, Pasini B, Radifannie P, Manreyna S, Eric DM, Chaparro N, Venice E, Dariela H, Mota E, Param J, Gronattila J, Macario B, Tulinius H, Thorlacius S, Eerola H, Nevanlinna H, Portia K, Maggie OP. Average risks of breast and ovarian cancer associated with BRCA1 or BRCA2 mutations detected in case series unselected for family history: a combined analysis of 222 studies. Am J Hum Pratima. 2003;72:1117-30.  Janak N, Emiliana M, Keegan G.  BRCA1 and BRCA2 Hereditary Breast and Ovarian Cancer. Gene Reviews online. 2013.  Rasta YC, En S, Parisa G, Banuelos S. Breast cancer risk among male BRCA1 and BRCA2 mutation carriers. J Natl Cancer Inst. 2007;99:1811-4.  John BOUDREAUX, Valentina I, Freddie J, Cami E, Kelley ER, Ilya F. Risk of breast cancer in male BRCA2 carriers. J Med Pratima. 2010;47:710-1.  National Comprehensive Cancer Network. Clinical practice guidelines in oncology, colorectal cancer screening. Available online (registration required). 2015.  Javier MH, Link J, Marielle J, Ashley ENGLISH, Jake MS, Eng C. Lifetime cancer risks in individuals with germline PTEN mutations. Clin Cancer Res. 2012;18:400-7.  Nick R. Cowden Syndrome: A Critical Review of the Clinical Literature. J Pratima . 2009:18:13-27.  Vidya ARDON, Prosper SINGLETARY, Rafael S, Cassandra P, Reena T, Frantz M, Donavon B, Cindy H, Amelie R, Deepak K, Kashif L, John BOUDREAUX, Eric SINGLETARY, Keenan DF, Hortencia MR, The Breast Cancer Susceptibility Collaboration (UK) & Lisa SAEED. ERLIN mutations that cause ataxia-telangiectasia are breast cancer susceptibility alleles. Nature Genetics. 2006;38:873-875  Sonny N , Ike Y, Ashley J, Nay L, Ky AGRAWAL , Adonis ML, Saran S, Ronda AG, Shanika S, Hadley ML, Jamshid J , Amber R, Emma MORALES, Dominic  JR, Priscila VE, Carlos M, Votimstein B, Colby N, Luis Eduardo RH, Zofia KW, and Angus AP. ERLIN mutations in patients with hereditary pancreatic cancer. Cancer Discover. 2012;2:41-46  Augustin FISHER., et al. Breast-Cancer Risk in Families with Mutations in PALB2. NEJM. 2014; 371(6):497-506.  CHEK2 Breast Cancer Case-Control Consortium. CHEK2*1100delC and susceptibility to breast cancer: A collaborative analysis involving 10,860 breast cancer cases and 9,065 controls from 10 studies. Am J Hum Pratima, 74 (2004), pp. 9688-2184  Jordan T, Sj S, Anant K, et al. Spectrum of Mutations in BRCA1, BRCA2, CHEK2, and TP53 in Families at High Risk of Breast Cancer. BRENT. 2006;295(12):5575-6028.   Renee C, Katiuska D, Sonia ARDON, et al. Risk of breast cancer in women with a CHEK2 mutation with and without a family history of breast cancer. J Clin Oncol. 2011;29:2295-6911.  Song H, Stantons E, Ramus SJ, et al. Contribution of germline mutations in the RAD51B, RAD51C, and RAD51D genes to ovarian cancer in the population. J Clin Oncol. 2015;33(26):6233-7429. Doi:10.1200/JCO.2015.61.2408.  Miquel T, Viola DF, Maylin P, et al. Mutations in BRIP1 confer high risk of ovarian cancer. Mari Pratima. 2011;43(11):1226-5337. doi:10.1038/ng.955.

## 2023-04-20 NOTE — PROGRESS NOTES
Cancer Risk Management Program Genetic Counseling Notes    4/19/2023    Referring Provider: Phuong Clay CNP    Presenting Information:   I had a telephone visit with Chiquita Baca today for genetic counseling through the Cancer Risk Management Program, in order to discuss her family history of breast, prostate, and colon cancer.  She presents today to review this history, cancer screening recommendations, and available genetic testing options.    Personal History:  Chiquita is a 52 year old female. She does not have any personal history of cancer.  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 She also reports a history of migraines (for which she takes medication) and chronic sinus infections (for which she has had a sinus surgery).  With respect to her cancer screening history, Chiquita's most recent mammogram was in February and was negative; she reports no history of previous abnormal mammograms or breast biopsies. Chiquita reports not history of abnormal Pap smears. She has been recently referred for her first screening colonoscopy, but this has not yet been completed.    With respect to her hormone-based medical history, Chiquita reports that she had her first menstrual period at age 15 and that she is currently going through the menopause process.  She states that she has been using an over-the-counter estrogen product for around a year.  She also reports that she used oral contraceptives in the past for around 10 years.  Chiquita has her ovaries, fallopian tubes, and cervix intact.      Chiquita reports no personal history of smoking and no personal history of chronic, excessive alcohol use.  Chiquita states that she is not aware of any other personal history of any specific, potentially concerning environmental exposures with respect to cancer risk.    Family History: (Please see scanned pedigree for detailed family history  information)    Chiquita reports that her mother was diagnosed with breast cancer at the age of 71, for which she underwent a lumpectomy and radiation.    Chiquita states that her maternal grandmother was diagnosed with breast cancer at age 50 and then later diagnosed with colon cancer at age 80.    Chiquita reports that her father was diagnosed with prostate cancer at age 72; she reports that this is in early stage prostate cancer that has not yet required any treatment.    Information about ancestry was collected, as specific genetic variants may be more common is certain ethnic backgrounds. Chiquita states that her her family is of general  ancestry.  There is no known Ashkenazi Alevism ancestry on either side of her family. There is no reported consanguinity.    Discussion:    We reviewed the features of sporadic, familial, and hereditary cancers. In looking at Chiquita's family history, it is possible that a cancer susceptibility gene mutation is present due to the presence of two generations of breast cancer in Chiquita's family, with one of these cancers being of early onset. In addition, her maternal grandmother had a history of two different cancers.      We discussed the natural history and genetics of hereditary cancer syndromes associated with breast (and other) cancer. A detailed handout regarding some of these hereditary cancer syndromes and the information we discussed was provided to Chiquita after our appointment and can be found in the after visit summary. Topics included: inheritance pattern, cancer risks, cancer screening recommendations, and also risks, benefits and limitations of testing.      Based on her family history, Chiquita meets current National Comprehensive Cancer Network (NCCN) criteria for genetic testing of high-penetrance breast and/or ovarian cancer susceptibility genes (including BRCA1, BRCA2, CDH1, PALB2, PTEN, and TP53), because of her family history of two close relatives (her mother  and maternal grandmother) with breast cancer, one of which was diagnosed with breast cancer at any early age (her maternal grandmother).        We discussed that there are additional genes besides those listed above that could cause increased risk for breast (and other) cancer. As many of these genes present with overlapping features in a family and accurate cancer risk cannot always be established based upon the pedigree analysis alone, it would be reasonable for Chiquita to consider panel genetic testing to analyze multiple genes at once.      Chiquita stated that she was interested in pursuing genetic testing through a panel of genes associated with hereditary cancer syndromes, and so we reviewed the various panel options and their potential benefits and limitations.  After this conversation, Chiquita decided that she was interested in a BRCA1/BRCA genetic analysis with an automatic reflex to the Common Hereditary Cancers genetic testing panel.      The Common Hereditary Cancers genetic testing panel through [facility] includes analysis for 47 genes associated with cancers of the breast, ovary, uterus, prostate and gastrointestinal system: APC, ERLIN, AXIN2, BARD1, BMPR1A, BRCA1, BRCA2, BRIP1, CDH1, CDK4, CDKN2A, CHEK2, CTNNA1, DICER1, EPCAM, GREM1, HOXB13, KIT, MEN1, MLH1, MSH2, MSH3, MSH6, MUTYH, NBN, NF1, NTHL1, PALB2, PDGFRA, PMS2, POLD1, POLE, PTEN,RAD50, RAD51C, RAD51D, SDHA, SDHB, SDHC, SDHD, SMAD4, SMARCA4, STK11, TP53,TSC1, TSC2, and VHL.        We discussed that many of these genes are associated with specific hereditary cancer syndromes and published management guidelines: Hereditary Breast and Ovarian Cancer syndrome (BRCA1, BRCA2), Brown syndrome (MLH1, MSH2, MSH6, PMS2, EPCAM), Familial Adenomatous Polyposis (APC), Hereditary Diffuse Gastric Cancer (CDH1), Familial Atypical Multiple Mole Melanoma syndrome (CDK4, CDKN2A), Multiple Endocrine Neoplasia type 1 (MEN1), Juvenile Polyposis syndrome (BMPR1A, SMAD4),  Cowden syndrome (PTEN), Li Fraumeni syndrome (TP53), Hereditary Paraganglioma and Pheochromocytoma syndrome (SDHA, SDHB, SDHC, SDHD), Peutz-Jeghers syndrome (STK11), MUTYH Associated Polyposis (MUTYH), Tuberous sclerosis complex (TSC1, TSC2), Von Hippel-Lindau disease (VHL), and Neurofibromatosis type 1 (NF1). The ERLIN, AXIN2, BRIP1, CHEK2, GREM1, MSH3, NBN, NTHL1, PALB2, POLD1, POLE, RAD51C, and RAD51D genes are associated with increased cancer risk and have published management guidelines for certain cancers. The remaining genes (BARD1, CTNNA1, DICER1, HOXB13, KIT, PDGFRA, RAD50, and SMARCA4) are associated with increased cancer risk and may allow us to make medical recommendations when mutations are identified.       Medical Management: For Chiquita, we reviewed that the information from genetic testing may determine:    additional cancer screening for which Chiquita may qualify (eg. mammogram and breast MRI, more frequent colonoscopies, more frequent dermatologic exams, etc.),    options for risk-reducing surgeries Chiquita could consider, if indicated (eg. bilateral mastectomy, surgery to remove her ovaries and/or uterus, etc.),      and targeted therapies for Chiquita, if she were to develop certain cancers in the future (eg. lumpectomy versus mastectomy,  immunotherapy for individuals with Brown syndrome, PARP inhibitors for HBOC syndrome, etc.).       These recommendations and possible targeted therapies will be discussed in detail once genetic testing is completed.     Plan:  1) Today, Chiquita decided to proceed with a BRCA1/BRCA2 analysis with an automatic reflex to the Common Hereditary Cancers genetic testing panel.  Therefore, consent was reviewed and verbally obtained for this testing.    2) We reviewed the options for sample collection.  Chiquita  plans to have her blood drawn at her local [facility] lab.Test results are expected to be available within 4-6 weeks.  3) Chiquita will either have a telephone  visit or video visit to discuss the results.  Additional recommendations about screening will be made at that time.    Saba Bee MS, [facility]  Genetic Counselor  Cancer Risk Management Program     Time spent over the phone: 33 minutes

## 2023-04-24 ENCOUNTER — OFFICE VISIT (OUTPATIENT)
Dept: INTERNAL MEDICINE | Facility: CLINIC | Age: 53
End: 2023-04-24
Payer: COMMERCIAL

## 2023-04-24 VITALS
BODY MASS INDEX: 33.49 KG/M2 | WEIGHT: 182 LBS | DIASTOLIC BLOOD PRESSURE: 92 MMHG | HEART RATE: 86 BPM | RESPIRATION RATE: 18 BRPM | TEMPERATURE: 97.8 F | OXYGEN SATURATION: 98 % | HEIGHT: 62 IN | SYSTOLIC BLOOD PRESSURE: 130 MMHG

## 2023-04-24 DIAGNOSIS — E66.09 CLASS 1 OBESITY DUE TO EXCESS CALORIES WITHOUT SERIOUS COMORBIDITY WITH BODY MASS INDEX (BMI) OF 33.0 TO 33.9 IN ADULT: ICD-10-CM

## 2023-04-24 DIAGNOSIS — E66.811 CLASS 1 OBESITY DUE TO EXCESS CALORIES WITHOUT SERIOUS COMORBIDITY WITH BODY MASS INDEX (BMI) OF 33.0 TO 33.9 IN ADULT: ICD-10-CM

## 2023-04-24 DIAGNOSIS — G43.001 MIGRAINE WITHOUT AURA AND WITH STATUS MIGRAINOSUS, NOT INTRACTABLE: Primary | ICD-10-CM

## 2023-04-24 PROBLEM — G43.009 MIGRAINE HEADACHE WITHOUT AURA: Status: ACTIVE | Noted: 2018-11-01

## 2023-04-24 PROCEDURE — 99213 OFFICE O/P EST LOW 20 MIN: CPT | Performed by: NURSE PRACTITIONER

## 2023-04-24 NOTE — PATIENT INSTRUCTIONS
Continue your current medications.    Keep working on diet and exercise.    Shingles shots in the community at a pharmacy.     I will see you back for your physical in about 9 months, please let me know before then if anything comes up.

## 2023-04-24 NOTE — PROGRESS NOTES
Assessment & Plan     Migraine without aura and with status migrainosus, not intractable: Much improved with Elavil and PRN Imitriex. Continue current dosing. Follow up if having increased headaches.     Class 1 obesity due to excess calories without serious comorbidity with body mass index (BMI) of 33.0 to 33.9 in adult: BMI today was 33.83. She is working on diet and exercise. Discussed adding in weights with her cardio; continue the cardiometabolic lifestyle.       Phuong Clay CNP  [facility]    Roque Porras is a 52 year old, presenting for the following health issues:  RECHECK        4/24/2023     8:02 AM   Additional Questions   Roomed by Victoria GARCÍA MA     History of Present Illness       Reason for visit:  Follow uo    She eats 2-3 servings of fruits and vegetables daily.She consumes 0 sweetened beverage(s) daily.She exercises with enough effort to increase her heart rate 30 to 60 minutes per day.  She exercises with enough effort to increase her heart rate 5 days per week.   She is taking medications regularly.     The patient presents today for follow up.    She reports that the Elavil has been very helpful for migraine headaches. She is now having 1-2 headaches per month. She is sleeping well.    She is working on diet and exercise. She is using her elliptical 40 mins 4-5 times per week.    She has gained a little bit of weight, but has started the cardiometabolic lifestyle.    She denies any new concerns today.    Her PAP per chart review was done in [place] 01/28/2022; was normal; 5 year follow up.    She just received her call for a colonoscopy, will schedule this. She is at low risk for hepatitis B, will discontinue this series.    She will get the shingles shots in the community.    Review of Systems   Constitutional, HEENT, cardiovascular, pulmonary, GI, , musculoskeletal, neuro, skin, endocrine and psych systems are negative, except as otherwise  "noted.      Objective    BP (!) 130/92   Pulse 86   Temp 97.8  F (36.6  C) (Oral)   Resp 18   Ht 1.562 m (5' 1.5\")   Wt 82.6 kg (182 lb)   LMP 10/01/2022   SpO2 98%   BMI 33.83 kg/m    Body mass index is 33.83 kg/m .  Physical Exam   GENERAL: healthy, alert and no distress  EYES: Eyes grossly normal to inspection  RESP: lungs clear to auscultation - no rales, rhonchi or wheezes  CV: regular rate and rhythm, normal S1 S2, no S3 or S4, no murmur, click or rub  MS: no gross musculoskeletal defects noted  SKIN: no suspicious lesions or rashes  NEURO: Normal strength and tone, mentation intact and speech normal  PSYCH: mentation appears normal, affect normal/bright        "

## 2023-04-26 ENCOUNTER — LAB (OUTPATIENT)
Dept: LAB | Facility: CLINIC | Age: 53
End: 2023-04-26
Payer: COMMERCIAL

## 2023-04-26 DIAGNOSIS — Z80.0 FAMILY HISTORY OF COLON CANCER: ICD-10-CM

## 2023-04-26 DIAGNOSIS — Z80.42 FAMILY HISTORY OF PROSTATE CANCER: ICD-10-CM

## 2023-04-26 DIAGNOSIS — Z80.3 FAMILY HISTORY OF MALIGNANT NEOPLASM OF BREAST: ICD-10-CM

## 2023-04-26 PROCEDURE — 99000 SPECIMEN HANDLING OFFICE-LAB: CPT

## 2023-04-26 PROCEDURE — 36415 COLL VENOUS BLD VENIPUNCTURE: CPT

## 2023-05-08 LAB — SCANNED LAB RESULT: NORMAL

## 2023-05-23 ENCOUNTER — VIRTUAL VISIT (OUTPATIENT)
Dept: ONCOLOGY | Facility: CLINIC | Age: 53
End: 2023-05-23
Attending: GENETIC COUNSELOR, MS
Payer: COMMERCIAL

## 2023-05-23 DIAGNOSIS — Z80.0 FAMILY HISTORY OF COLON CANCER: ICD-10-CM

## 2023-05-23 DIAGNOSIS — Z80.3 FAMILY HISTORY OF MALIGNANT NEOPLASM OF BREAST: Primary | ICD-10-CM

## 2023-05-23 DIAGNOSIS — Z80.42 FAMILY HISTORY OF PROSTATE CANCER: ICD-10-CM

## 2023-05-23 PROCEDURE — 999N000069 HC STATISTIC GENETIC COUNSELING, < 16 MIN: Mod: GT,95 | Performed by: GENETIC COUNSELOR, MS

## 2023-05-23 NOTE — LETTER
5/23/2023         RE: Chiquita Baca  [address] [address]        Dear Colleague,    Thank you for referring your patient, Chiquita Baca, to the [facility]. Please see a copy of my visit note below.    Cancer Risk Management Program Genetic Counseling Note    5/23/2023    Referring Provider:  Phuong Clay CNP    Presenting Information:  Chiquita had a return video visit through the Cancer Risk Management Program, in order to discuss her genetic testing results. Her blood was drawn on 4-.  A BRCA1/BRCA2 genetic analysis with an automatic reflex to the Common Hereditary Cancers genetic testing panel through [employer]. This testing was done because of her family history of breast cancer.    Genetic Testing Result: One Variant of Uncertain Significance (VUS); All other genes negative  Chiquita was found to have a variant of uncertain significance (VUS) in the CDH1 gene. This variant is called c.1634G>A (also known as p.[id number]). No other variants or mutations were found in the CDH1 gene. Given the uncertain significance of this result, medical management decisions should NOT be made based on this test result alone.    Of note, Chiquita tested negative for mutations in the following genes: Chiquita is negative for mutations in the following genes:  APC, ERLIN, AXIN2, BARD1, BMPR1A, BRCA1, BRCA2, BRIP1, CDK4, CDKN2A, CHEK2, CTNNA1, DICER1, EPCAM, GREM1, HOXB13, KIT, MEN1, MLH1, MSH2, MSH3, MSH6, MUTYH, NBN, NF1, NTHL1, PALB2, PDGFRA, PMS2, POLD1, POLE, PTEN,RAD50, RAD51C, RAD51D, SDHA, SDHB, SDHC, SDHD, SMAD4, SMARCA4, STK11, TP53,TSC1, TSC2, and VHL.      Therefore, genetic testing did not detect an identifiable mutation associated with Hereditary Breast and Ovarian Cancer syndrome (BRCA1, BRCA2), Brown syndrome (MLH1, MSH2, MSH6, PMS2, EPCAM), Familial Adenomatous Polyposis (APC), Familial Atypical Multiple Mole Melanoma syndrome (CDK4,  "CDKN2A), Juvenile Polyposis syndrome (BMPR1A, SMAD4), Cowden syndrome (PTEN), Li Fraumeni syndrome (TP53), Peutz-Jeghers syndrome (STK11), MUTYH Associated Polyposis (MUTYH), Hereditary Paraganglioma and Pheochromocytoma syndrome (SDHA, SDHB, SDHC, SDHD), Tuberous sclerosis complex (TSC1, TSC2), Von Hippel-Lindau disease (VHL), Neurofibromatosis type 1 (NF1), and Multiple Endocrine Neoplasia type 1 (MEN1).     A copy of the test report can be found in the Laboratory tab, dated 4-, and named \"LABORATORY MISCELLANEOUS ORDER\". The report is scanned in as a linked document.     Interpretation:  We discussed different possible interpretations of this \"variant of uncertain significance\" test result. It is not clear if this variant in the CDH1 gene is associated with an increased cancer risk:  1. This variant may be a benign change that does not increase cancer risk.  2. This variant may be a harmful mutation that causes an increased risk for cancer.    Individuals with a harmful mutation in the CDH1 gene have a condition known as Hereditary Diffuse Gastric Cancer (HDGC) syndrome.  HDGC syndrome is associated with an increased risks for diffuse gastric cancer; diffuse gastric cancer infiltrates the stomach wall without forming a distinct mass. Many of these gastric cancers are diagnosed before age 40  Females with HDGC syndrome are also at increased risk for lobular breast cancer.     Again, it is not known at this time if the CDH1 gene variant seen in Chiquita is a harmful mutation or, instead, a benign variation of normal. Of note, there is no known history of gastric cancer in Chiquita or her family. Genetic testing labs are working to collect evidence about if this variant is harmful or benign, and they will contact me if it is reclassified. If this variant is determined to be a benign change, there may be a different gene or combination of genes and environment that are associated with the cancers in this " "family.    It is also important to consider that one of Chiquita's relatives may have had a mutation in one of the genes tested, but she did not inherit it. Or, alternatively, it is possible that the cancers in Chiquita's family were \"sporadic\" cancers that were not related to the inheritance of a specific, single genetic risk factor.    Inheritance:  We reviewed the inheritance of this variant in the CDH1 gene. Chiquita had a 50% chance to pass on a copy of this variant to each of her children. Likewise, assuming that Chiquita inherited this variant from one her parents, her sister would have a 50% chance of having a copy of the same variant (assuming that Chiquita and her sister are not identical twins). Because it is unclear what, if any, risk is associated with this variant, clinical genetic testing for this CDH1 variant alone is not recommended for relatives.    Screening:  Based on this \"variant of uncertain significance\" test result, it is important for Chiquita to refer back to the family history for appropriate cancer screening:    We talked about today that since we do not have a genetic explanation for the breast cancers in [place], this genetic test result does not give us specific breast cancer risk information for Chiquita personally.  In this circumstance, we talked about that a few different breast cancer risk models have been used to try to estimate a woman's breast cancer risk, in order to determine which women should consider increased breast cancer surveillance:      - We talked about that the Aurelio model is based on family history of breast cancer in first and second degree relatives only; we talked about that this model would allow me to estimate breast cancer risk based on her mother and her maternal grandmother's history of breast cancer.  Chiquita reports that her mother was diagnosed with breast cancer in her 70s and that her maternal grandmother was diagnosed with breast cancer in her 50; using " this information, the Aurelio tables gives an estimated 14.3% lifetime risk of breast cancer.        - We talked about that another model (the JEREMIAS breast cancer risk calculator) gives breast cancer risk estimates based on family history, plus some additional risk factors such as breast density, previous breast biopsies, age of menarche/first child, and previous genetic testing results; the JEREMIAS risk calculator predicted around a 14.9% lifetime risk for breast cancer for Chiquita.    -We talked about that when a women's estimated lifetime risk of breast cancer is 20% or higher based on at least one of these models, it is generally recommended that they have additional conversations about the options for increased breast cancer surveillance. However, based on the information we currently have, Chiquita's estimated breast cancer risk does not meet this threshold.  Therefore, Chiquita should continue with her annual mammogram screening.    Other population cancer screening options, such as those recommended by the American Cancer Society and the National Comprehensive Cancer Network (NCCN), are also appropriate for Chiquita. Chiquita is encouraged to complete the colonoscopy that she was recently referred for.    These screening recommendations may change if there are changes to Chiquita's personal and/or family history of cancer. Final screening recommendations should be made by each individual's primary care provider.    Additional Testing Considerations:  It is possible Chiquita does carry a gene or combination of genes and environment that increase her  risk for cancer that was not identifiable through this particular genetic testing panel. Chiquita is encouraged to contact me in the future if she wants to know if there is additional genetic testing that might be available/indicated for her then or if she wishes to readdress larger gene panel options in the future.     Although Chiquita's genetic testing result did not identify a  "known harmful mutation, other relatives may still carry a known harmful gene mutation associated with an increased risk for cancer. Genetic counseling is recommended for Chiquita's mother (or other interested relatives) in order to discuss genetic testing options. If any of these relatives do pursue genetic testing, Chiquita is encouraged to contact me so that we may review the impact of their test results on her.    Summary:  We do not have an explanation for Chiquita's family history of cancer. While no obviously harmful genetic changes were identified, Chiquita may still be at risk for certain cancers due to family history, environmental factors, or other genetic causes not identified by this test.  Because of that, it is important that she continue with cancer screening based on her personal and family history as discussed above.    Genetic testing is rapidly advancing, and new cancer susceptibility genes will most likely be identified in the future. Therefore, I encouraged Chiquita to contact me periodically or if there are changes in her personal or family history. This may change how we assess her cancer risk, screening, and the testing we would offer.    Plan:  1.  I released to Chiquita  a copy of her test results today through the [employer] portal.  She will also get a copy of this results note and a handout about \"variant of uncertain significance\" results.  2.  Chiquita plans to follow-up with her primary care provider regarding her cancer screening as previously recommended.  3.  Chiquita should contact me regularly, or sooner if her family history changes, and she would like to know if there are additional screening or testing recommendations at that time.  4.  I will contact Chiquita if the laboratory informs me that this VUS has been reclassified.  This may change screening and testing recommendations for Chiquita and her relatives.    If Chiquita has any further questions, I encouraged her to contact me at [phone] or " via [employer] or through email: [email or web address].    Saba Bee MS, [facility]  Genetic Counselor  Cancer Risk Management Program    Time spent face to face over video: 13 minutes  (8:06AM - 8:19AM)    Virtual Visit Details    Type of service:  Video Visit     Originating Location (pt. Location): Work  Distant Location (provider location):  Off-site  Platform used for Video Visit: Emanuel

## 2023-05-23 NOTE — PROGRESS NOTES
Cancer Risk Management Program Genetic Counseling Note    5/23/2023    Referring Provider:  Phuong Clay CNP    Presenting Information:  Chiquita had a return video visit through the Cancer Risk Management Program, in order to discuss her genetic testing results. Her blood was drawn on 4-.  A BRCA1/BRCA2 genetic analysis with an automatic reflex to the Common Hereditary Cancers genetic testing panel through [employer]. This testing was done because of her family history of breast cancer.    Genetic Testing Result: One Variant of Uncertain Significance (VUS); All other genes negative  Chiquita was found to have a variant of uncertain significance (VUS) in the CDH1 gene. This variant is called c.1634G>A (also known as p.[id number]). No other variants or mutations were found in the CDH1 gene. Given the uncertain significance of this result, medical management decisions should NOT be made based on this test result alone.    Of note, Chiquita tested negative for mutations in the following genes: Chiquita is negative for mutations in the following genes:  APC, ERLIN, AXIN2, BARD1, BMPR1A, BRCA1, BRCA2, BRIP1, CDK4, CDKN2A, CHEK2, CTNNA1, DICER1, EPCAM, GREM1, HOXB13, KIT, MEN1, MLH1, MSH2, MSH3, MSH6, MUTYH, NBN, NF1, NTHL1, PALB2, PDGFRA, PMS2, POLD1, POLE, PTEN,RAD50, RAD51C, RAD51D, SDHA, SDHB, SDHC, SDHD, SMAD4, SMARCA4, STK11, TP53,TSC1, TSC2, and VHL.      Therefore, genetic testing did not detect an identifiable mutation associated with Hereditary Breast and Ovarian Cancer syndrome (BRCA1, BRCA2), Brown syndrome (MLH1, MSH2, MSH6, PMS2, EPCAM), Familial Adenomatous Polyposis (APC), Familial Atypical Multiple Mole Melanoma syndrome (CDK4, CDKN2A), Juvenile Polyposis syndrome (BMPR1A, SMAD4), Cowden syndrome (PTEN), Li Fraumeni syndrome (TP53), Peutz-Jeghers syndrome (STK11), MUTYH Associated Polyposis (MUTYH), Hereditary Paraganglioma and Pheochromocytoma syndrome (SDHA, SDHB, SDHC, SDHD), Tuberous sclerosis complex  "(TSC1, TSC2), Von Hippel-Lindau disease (VHL), Neurofibromatosis type 1 (NF1), and Multiple Endocrine Neoplasia type 1 (MEN1).     A copy of the test report can be found in the Laboratory tab, dated 4-, and named \"LABORATORY MISCELLANEOUS ORDER\". The report is scanned in as a linked document.     Interpretation:  We discussed different possible interpretations of this \"variant of uncertain significance\" test result. It is not clear if this variant in the CDH1 gene is associated with an increased cancer risk:  1. This variant may be a benign change that does not increase cancer risk.  2. This variant may be a harmful mutation that causes an increased risk for cancer.    Individuals with a harmful mutation in the CDH1 gene have a condition known as Hereditary Diffuse Gastric Cancer (HDGC) syndrome.  HDGC syndrome is associated with an increased risks for diffuse gastric cancer; diffuse gastric cancer infiltrates the stomach wall without forming a distinct mass. Many of these gastric cancers are diagnosed before age 40  Females with HDGC syndrome are also at increased risk for lobular breast cancer.     Again, it is not known at this time if the CDH1 gene variant seen in Chiquita is a harmful mutation or, instead, a benign variation of normal. Of note, there is no known history of gastric cancer in Chiquita or her family. Genetic testing labs are working to collect evidence about if this variant is harmful or benign, and they will contact me if it is reclassified. If this variant is determined to be a benign change, there may be a different gene or combination of genes and environment that are associated with the cancers in this family.    It is also important to consider that one of Chiquita's relatives may have had a mutation in one of the genes tested, but she did not inherit it. Or, alternatively, it is possible that the cancers in Chiquita's family were \"sporadic\" cancers that were not related to the inheritance of " "a specific, single genetic risk factor.    Inheritance:  We reviewed the inheritance of this variant in the CDH1 gene. Chiquita had a 50% chance to pass on a copy of this variant to each of her children. Likewise, assuming that Chiquita inherited this variant from one her parents, her sister would have a 50% chance of having a copy of the same variant (assuming that Chiquita and her sister are not identical twins). Because it is unclear what, if any, risk is associated with this variant, clinical genetic testing for this CDH1 variant alone is not recommended for relatives.    Screening:  Based on this \"variant of uncertain significance\" test result, it is important for Chiquita to refer back to the family history for appropriate cancer screening:      We talked about today that since we do not have a genetic explanation for the breast cancers in [place], this genetic test result does not give us specific breast cancer risk information for Chiquita personally.  In this circumstance, we talked about that a few different breast cancer risk models have been used to try to estimate a woman's breast cancer risk, in order to determine which women should consider increased breast cancer surveillance:      - We talked about that the Aurelio model is based on family history of breast cancer in first and second degree relatives only; we talked about that this model would allow me to estimate breast cancer risk based on her mother and her maternal grandmother's history of breast cancer.  Chiquita reports that her mother was diagnosed with breast cancer in her 70s and that her maternal grandmother was diagnosed with breast cancer in her 50; using this information, the Aurelio tables gives an estimated 14.3% lifetime risk of breast cancer.        - We talked about that another model (the JEREMIAS breast cancer risk calculator) gives breast cancer risk estimates based on family history, plus some additional risk factors such as breast density, " previous breast biopsies, age of menarche/first child, and previous genetic testing results; the JEREMIAS risk calculator predicted around a 14.9% lifetime risk for breast cancer for Chiquita.    -We talked about that when a women's estimated lifetime risk of breast cancer is 20% or higher based on at least one of these models, it is generally recommended that they have additional conversations about the options for increased breast cancer surveillance. However, based on the information we currently have, Chiquita's estimated breast cancer risk does not meet this threshold.  Therefore, Chiquita should continue with her annual mammogram screening.      Other population cancer screening options, such as those recommended by the American Cancer Society and the National Comprehensive Cancer Network (NCCN), are also appropriate for Chiquita. Chiquita is encouraged to complete the colonoscopy that she was recently referred for.      These screening recommendations may change if there are changes to Chiquita's personal and/or family history of cancer. Final screening recommendations should be made by each individual's primary care provider.    Additional Testing Considerations:  It is possible Chiquita does carry a gene or combination of genes and environment that increase her  risk for cancer that was not identifiable through this particular genetic testing panel. Chiquita is encouraged to contact me in the future if she wants to know if there is additional genetic testing that might be available/indicated for her then or if she wishes to readdress larger gene panel options in the future.     Although Chiquita's genetic testing result did not identify a known harmful mutation, other relatives may still carry a known harmful gene mutation associated with an increased risk for cancer. Genetic counseling is recommended for Chiquita's mother (or other interested relatives) in order to discuss genetic testing options. If any of these relatives do  "pursue genetic testing, Chiquita is encouraged to contact me so that we may review the impact of their test results on her.    Summary:  We do not have an explanation for Chiquita's family history of cancer. While no obviously harmful genetic changes were identified, Chiquita may still be at risk for certain cancers due to family history, environmental factors, or other genetic causes not identified by this test.  Because of that, it is important that she continue with cancer screening based on her personal and family history as discussed above.    Genetic testing is rapidly advancing, and new cancer susceptibility genes will most likely be identified in the future. Therefore, I encouraged Chiquita to contact me periodically or if there are changes in her personal or family history. This may change how we assess her cancer risk, screening, and the testing we would offer.    Plan:  1.  I released to Chiquita  a copy of her test results today through the [employer] portal.  She will also get a copy of this results note and a handout about \"variant of uncertain significance\" results.  2.  Chiquita plans to follow-up with her primary care provider regarding her cancer screening as previously recommended.  3.  hCiquita should contact me regularly, or sooner if her family history changes, and she would like to know if there are additional screening or testing recommendations at that time.  4.  I will contact Chiquita if the laboratory informs me that this VUS has been reclassified.  This may change screening and testing recommendations for Chiquita and her relatives.    If Chiquita has any further questions, I encouraged her to contact me at [phone] or via [employer] or through email: [email or web address].    Saba Bee MS, [facility]  Genetic Counselor  Cancer Risk Management Program    Time spent face to face over video: 13 minutes  (8:06AM - 8:19AM)    Virtual Visit Details    Type of service:  Video Visit     Originating Location (pt. " Location): Work  Distant Location (provider location):  Off-site  Platform used for Video Visit: Emanuel

## 2023-05-23 NOTE — NURSING NOTE
Is the patient currently in the state of [place]? YES    Visit mode:VIDEO    If the visit is dropped, the patient can be reconnected by: VIDEO VISIT: Text to cell phone: [phone]    Will anyone else be joining the visit? NO      How would you like to obtain your AVS? MyChart    Are changes needed to the allergy or medication list? NO    Reason for visit: RECHECK      Juanis Gomez VF

## 2023-05-23 NOTE — PATIENT INSTRUCTIONS
Genetic Testing  Genetic testing involved a blood or saliva test which looked at the genetic information in select genes for variants associated with cancer risk.  This testing may have included analysis of a single gene due to a known variant in the family, multiple genes most associated with the cancers in a family, or an expanded panel of genes related to many types of cancers.    Results  There are several possible genetic test results, including:   Positive--a harmful mutation (also known as a  pathogenic  or  likely pathogenic  variant) was identified in a gene associated with increased cancer risk.  These risks, as well as medical management options, depend on the specific genetic variant identified.    Negative--no variants were identified in the genes analyzed   Variant of unknown significance--a variant was identified in one or more genes, though it is currently unclear how this impacts cancer risk in the family.  Genetic testing labs are working to collect evidence about these uncertain variants and may provide updates in the future.    What is a Variant of Unknown Significance?  A variant of unknown significance (VUS) is a genetic change with unclear clinical significance.  Scientists currently do not know if this specific variant is associated with increased cancer risks,  or if it is a benign (harmless) change with no impact on health.       A variant may be of uncertain significance for several reasons.  It is possible that this specific variant has not been seen before by the laboratory, or only in a small number of families.  There is currently not enough information to know how this variant may impact your health.          Reclassification  Genetic testing laboratories and researchers are collecting evidence to determine the importance of variants of unknown significance.  Many variants of uncertain significance are later reclassified as benign findings, however some may be associated with  increased cancer risk.  Laboratories will often notify the genetic counselor/ordering provider when a patient s VUS has been reclassified.        Some families may be candidates for participation in the laboratory s variant research programs to help determine the importance of their VUS.  Participating in these programs does not guarantee that families will learn the significance of their VUS immediately.  It could be months or years before a VUS is reclassified.       Screening Recommendations  A combination of personal and family history factors may inform cancer risk and medical management recommendations.  Population cancer screening options, such as those recommended by the American Cancer Society and the National Comprehensive Cancer Network (NCCN) are appropriate for many families at average risk for cancer.  However, earlier and/or more frequent screening may be recommended based on personal factors (lifestyle, exposures, medications, screening results), family history of cancer, and sometimes genetic factors.  These cancer risk management options should be discussed in more detail with an individual's medical providers.      It is usually not recommended that other relatives have genetic testing for the VUS unless it is done as part of the laboratory s variant research program because an individual s test results should not influence their cancer screening until we determine the importance of the VUS.  Your genetic counselor can help you and your relatives understand the risks and benefits of all genetic testing and cancer screening options.    Please call us if you have any questions or concerns.   Cancer Risk Management Program (Appointments: [phone])  Greg Phillips, MS [facility] [place]  [phone]  Mandy Hinkle, MS, [facility] [place] [phone]  LAURE Bee, MS, [facility] [place]  [phone]    [email or web address]  Flora Delgado MS, [facility] [place]  [phone]  Peri Phelan, MS, [facility] [place]  [phone]  Maxine Wells MS,  [facility] [place] [phone]  Julia Benites, MS, [facility] [phone]

## 2023-05-23 NOTE — LETTER
Cancer Risk Management  Program Locations    [facility]  [facility]  [facility]  [facility]  Mailing Address  Cancer Risk Management Program  [address]  [place] [address]    New patient appointments  [phone]  May 27, 2023    Chiquita Baca  [address] [address]      Dear Chiquita,    It was a pleasure talking with you via your virtual genetic counseling visit on 5-.  Below is a copy of the progress note from that recent visit through the Cancer Risk Management Program.  If you have any additional questions, please feel free to contact me.    Cancer Risk Management Program Genetic Counseling Note    5/23/2023    Referring Provider:  Phuong Clay CNP    Presenting Information:  Chiquita had a return video visit through the Cancer Risk Management Program, in order to discuss her genetic testing results. Her blood was drawn on 4-.  A BRCA1/BRCA2 genetic analysis with an automatic reflex to the Common Hereditary Cancers genetic testing panel through [facility]. This testing was done because of her family history of breast cancer.    Genetic Testing Result: One Variant of Uncertain Significance (VUS); All other genes negative  Chiquita was found to have a variant of uncertain significance (VUS) in the CDH1 gene. This variant is called c.[id number] (also known as p.[id number]). No other variants or mutations were found in the CDH1 gene. Given the uncertain significance of this result, medical management decisions should NOT be made based on this test result alone.    Of note, Chiquita tested negative for mutations in the following genes: Chiquita is negative for mutations in the following genes:  APC, ERLIN, AXIN2, BARD1, BMPR1A, BRCA1, BRCA2, BRIP1, CDK4, CDKN2A, CHEK2, CTNNA1, DICER1, EPCAM, GREM1,  "HOXB13, KIT, MEN1, MLH1, MSH2, MSH3, MSH6, MUTYH, NBN, NF1, NTHL1, PALB2, PDGFRA, PMS2, POLD1, POLE, PTEN,RAD50, RAD51C, RAD51D, SDHA, SDHB, SDHC, SDHD, SMAD4, SMARCA4, STK11, TP53,TSC1, TSC2, and VHL.      Therefore, genetic testing did not detect an identifiable mutation associated with Hereditary Breast and Ovarian Cancer syndrome (BRCA1, BRCA2), Brown syndrome (MLH1, MSH2, MSH6, PMS2, EPCAM), Familial Adenomatous Polyposis (APC), Familial Atypical Multiple Mole Melanoma syndrome (CDK4, CDKN2A), Juvenile Polyposis syndrome (BMPR1A, SMAD4), Cowden syndrome (PTEN), Li Fraumeni syndrome (TP53), Peutz-Jeghers syndrome (STK11), MUTYH Associated Polyposis (MUTYH), Hereditary Paraganglioma and Pheochromocytoma syndrome (SDHA, SDHB, SDHC, SDHD), Tuberous sclerosis complex (TSC1, TSC2), Von Hippel-Lindau disease (VHL), Neurofibromatosis type 1 (NF1), and Multiple Endocrine Neoplasia type 1 (MEN1).     A copy of the test report can be found in the Laboratory tab, dated 4-, and named \"LABORATORY MISCELLANEOUS ORDER\". The report is scanned in as a linked document.     Interpretation:  We discussed different possible interpretations of this \"variant of uncertain significance\" test result. It is not clear if this variant in the CDH1 gene is associated with an increased cancer risk:  1. This variant may be a benign change that does not increase cancer risk.  2. This variant may be a harmful mutation that causes an increased risk for cancer.    Individuals with a harmful mutation in the CDH1 gene have a condition known as Hereditary Diffuse Gastric Cancer (HDGC) syndrome.  HDGC syndrome is associated with an increased risks for diffuse gastric cancer; diffuse gastric cancer infiltrates the stomach wall without forming a distinct mass. Many of these gastric cancers are diagnosed before age 40  Females with HDGC syndrome are also at increased risk for lobular breast cancer.     Again, it is not known at this time if the " "CDH1 gene variant seen in Chiquita is a harmful mutation or, instead, a benign variation of normal. Of note, there is no known history of gastric cancer in Chiquita or her family. Genetic testing labs are working to collect evidence about if this variant is harmful or benign, and they will contact me if it is reclassified. If this variant is determined to be a benign change, there may be a different gene or combination of genes and environment that are associated with the cancers in this family.    It is also important to consider that one of Chiquita's relatives may have had a mutation in one of the genes tested, but she did not inherit it. Or, alternatively, it is possible that the cancers in Chiquita's family were \"sporadic\" cancers that were not related to the inheritance of a specific, single genetic risk factor.    Inheritance:  We reviewed the inheritance of this variant in the CDH1 gene. Chiquita had a 50% chance to pass on a copy of this variant to each of her children. Likewise, assuming that Chiquita inherited this variant from one her parents, her sister would have a 50% chance of having a copy of the same variant (assuming that Chiquita and her sister are not identical twins). Because it is unclear what, if any, risk is associated with this variant, clinical genetic testing for this CDH1 variant alone is not recommended for relatives.    Screening:  Based on this \"variant of uncertain significance\" test result, it is important for Chiquita to refer back to the family history for appropriate cancer screening:    We talked about today that since we do not have a genetic explanation for the breast cancers in Johnathan family, this genetic test result does not give us specific breast cancer risk information for Chiquita personally.  In this circumstance, we talked about that a few different breast cancer risk models have been used to try to estimate a woman's breast cancer risk, in order to determine which women should " consider increased breast cancer surveillance:      - We talked about that the Aurelio model is based on family history of breast cancer in first and second degree relatives only; we talked about that this model would allow me to estimate breast cancer risk based on her mother and her maternal grandmother's history of breast cancer.  Chiquita reports that her mother was diagnosed with breast cancer in her 70s and that her maternal grandmother was diagnosed with breast cancer in her 50; using this information, the Aurelio tables gives an estimated 14.3% lifetime risk of breast cancer.        - We talked about that another model (the JEREMIAS breast cancer risk calculator) gives breast cancer risk estimates based on family history, plus some additional risk factors such as breast density, previous breast biopsies, age of menarche/first child, and previous genetic testing results; the JEREMIAS risk calculator predicted around a 14.9% lifetime risk for breast cancer for Chiquita.    -We talked about that when a women's estimated lifetime risk of breast cancer is 20% or higher based on at least one of these models, it is generally recommended that they have additional conversations about the options for increased breast cancer surveillance. However, based on the information we currently have, Chiquita's estimated breast cancer risk does not meet this threshold.  Therefore, Chiquita should continue with her annual mammogram screening.    Other population cancer screening options, such as those recommended by the American Cancer Society and the National Comprehensive Cancer Network (NCCN), are also appropriate for Chiquita. Chiquita is encouraged to complete the colonoscopy that she was recently referred for.    These screening recommendations may change if there are changes to Chiquita's personal and/or family history of cancer. Final screening recommendations should be made by each individual's primary care provider.    Additional Testing  "Considerations:  It is possible Chiquita does carry a gene or combination of genes and environment that increase her  risk for cancer that was not identifiable through this particular genetic testing panel. Chiquita is encouraged to contact me in the future if she wants to know if there is additional genetic testing that might be available/indicated for her then or if she wishes to readdress larger gene panel options in the future.     Although Chiquita's genetic testing result did not identify a known harmful mutation, other relatives may still carry a known harmful gene mutation associated with an increased risk for cancer. Genetic counseling is recommended for Chiquita's mother (or other interested relatives) in order to discuss genetic testing options. If any of these relatives do pursue genetic testing, Chiquita is encouraged to contact me so that we may review the impact of their test results on her.    Summary:  We do not have an explanation for Chiquita's family history of cancer. While no obviously harmful genetic changes were identified, Chiquita may still be at risk for certain cancers due to family history, environmental factors, or other genetic causes not identified by this test.  Because of that, it is important that she continue with cancer screening based on her personal and family history as discussed above.    Genetic testing is rapidly advancing, and new cancer susceptibility genes will most likely be identified in the future. Therefore, I encouraged Chiquita to contact me periodically or if there are changes in her personal or family history. This may change how we assess her cancer risk, screening, and the testing we would offer.    Plan:  1.  I released to Chiquita  a copy of her test results today through the [facility] portal.  She will also get a copy of this results note and a handout about \"variant of uncertain significance\" results.  2.  Chiquita plans to follow-up with her primary care provider regarding her " cancer screening as previously recommended.  3.  Chiquita should contact me regularly, or sooner if her family history changes, and she would like to know if there are additional screening or testing recommendations at that time.  4.  I will contact Chiquita if the laboratory informs me that this VUS has been reclassified.  This may change screening and testing recommendations for Chiquita and her relatives.    If Chiquita has any further questions, I encouraged her to contact me at [phone] or via [employer] or through email: [email or web address].    Saba Bee MS, [facility]  Genetic Counselor  Cancer Risk Management Program    Time spent face to face over video: 13 minutes  (8:06AM - 8:19AM)

## 2023-06-15 ENCOUNTER — MYC REFILL (OUTPATIENT)
Dept: INTERNAL MEDICINE | Facility: CLINIC | Age: 53
End: 2023-06-15
Payer: COMMERCIAL

## 2023-06-15 DIAGNOSIS — G43.001 MIGRAINE WITHOUT AURA AND WITH STATUS MIGRAINOSUS, NOT INTRACTABLE: Primary | ICD-10-CM

## 2023-06-16 RX ORDER — SUMATRIPTAN 100 MG/1
100 TABLET, FILM COATED ORAL
Qty: 15 TABLET | Refills: 3 | Status: SHIPPED | OUTPATIENT
Start: 2023-06-16 | End: 2024-05-06

## 2023-06-16 NOTE — TELEPHONE ENCOUNTER
"Routing refill request to provider for review/approval because:  Medication is reported/historical  Blood pressures  Serotonin agonist needs review    Last Written Prescription Date:  NA  Last Fill Quantity: NA,  # refills: NA   Last office visit provider:  4/24/2023     Requested Prescriptions   Pending Prescriptions Disp Refills     SUMAtriptan (IMITREX) 100 MG tablet       Sig: Take 1 tablet (100 mg) by mouth at onset of headache for migraine       Serotonin Agonists Failed - 6/15/2023  9:21 AM        Failed - Blood pressure under 140/90 in past 12 months     BP Readings from Last 3 Encounters:   04/24/23 (!) 130/92   01/23/23 (!) 130/90                 Failed - Serotonin Agonist request needs review.     Please review patient's record. If patient has had 8 or more treatments in the past month, please forward to provider.          Passed - Recent (12 mo) or future (30 days) visit within the authorizing provider's specialty     Patient has had an office visit with the authorizing provider or a provider within the authorizing providers department within the previous 12 mos or has a future within next 30 days. See \"Patient Info\" tab in inbasket, or \"Choose Columns\" in Meds & Orders section of the refill encounter.              Passed - Medication is active on med list        Passed - Patient is age 18 or older        Passed - No active pregnancy on record        Passed - No positive pregnancy test in past 12 months             Marleen Beverly RN 06/16/23 12:09 AM  "

## 2023-07-15 ENCOUNTER — MYC REFILL (OUTPATIENT)
Dept: INTERNAL MEDICINE | Facility: CLINIC | Age: 53
End: 2023-07-15
Payer: COMMERCIAL

## 2023-07-15 DIAGNOSIS — Z86.69 HISTORY OF MIGRAINE HEADACHES: ICD-10-CM

## 2023-07-16 NOTE — TELEPHONE ENCOUNTER
"Routing refill request to provider for review/approval because:  BP out of range    Last Written Prescription Date:  1/23/23  Last Fill Quantity: 173,  # refills: 1   Last office visit provider:  4/24/23     Requested Prescriptions   Pending Prescriptions Disp Refills     amitriptyline (ELAVIL) 25 MG tablet 173 tablet 1     Sig: Take 1 tablet (25 mg) by mouth At Bedtime for 7 days, THEN 2 tablets (50 mg) At Bedtime for 83 days.       Tricyclic Agents ( Annual appt and no PHQ9) Failed - 7/15/2023  9:47 PM        Failed - Blood Pressure under 140/90 in past 12 mos     BP Readings from Last 3 Encounters:   04/24/23 (!) 130/92   01/23/23 (!) 130/90                 Passed - Recent (12 mo) or future (30 days) visit within authorizing provider's specialty     Patient has had an office visit with the authorizing provider or a provider within the authorizing providers department within the previous 12 mos or has a future within next 30 days. See \"Patient Info\" tab in inbasket, or \"Choose Columns\" in Meds & Orders section of the refill encounter.              Passed - Medication is active on med list        Passed - Patient is age 18 or older        Passed - Patient is not pregnant        Passed - No positive pregnancy test on record in past 12 mos             Tamie Cameron RN 07/15/23 10:08 PM  "

## 2023-07-17 DIAGNOSIS — Z86.69 HISTORY OF MIGRAINE HEADACHES: ICD-10-CM

## 2023-07-17 NOTE — TELEPHONE ENCOUNTER
"Routing refill request to provider for review/approval because:  BP fail    Last Written Prescription Date:  1/23/23  Last Fill Quantity: 173,  # refills: 1   Last office visit provider:  4/24/23     Requested Prescriptions   Pending Prescriptions Disp Refills     amitriptyline (ELAVIL) 25 MG tablet [Pharmacy Med Name: AMITRIPTYLINE HCL 25 MG TAB] 173 tablet 1     Sig: TAKE 1 TABLET (25 MG) BY MOUTH AT BEDTIME FOR 7 DAYS, THEN 2 TABLETS (50 MG) AT BEDTIME FOR 83 DAYS.       Tricyclic Agents ( Annual appt and no PHQ9) Failed - 7/17/2023  1:49 AM        Failed - Blood Pressure under 140/90 in past 12 mos     BP Readings from Last 3 Encounters:   04/24/23 (!) 130/92   01/23/23 (!) 130/90                 Passed - Recent (12 mo) or future (30 days) visit within authorizing provider's specialty     Patient has had an office visit with the authorizing provider or a provider within the authorizing providers department within the previous 12 mos or has a future within next 30 days. See \"Patient Info\" tab in inbasket, or \"Choose Columns\" in Meds & Orders section of the refill encounter.              Passed - Medication is active on med list        Passed - Patient is age 18 or older        Passed - Patient is not pregnant        Passed - No positive pregnancy test on record in past 12 mos             Tamie Cameron RN 07/17/23 1:50 AM  "

## 2023-12-24 DIAGNOSIS — Z86.69 HISTORY OF MIGRAINE HEADACHES: ICD-10-CM

## 2024-01-04 ENCOUNTER — PATIENT OUTREACH (OUTPATIENT)
Dept: CARE COORDINATION | Facility: CLINIC | Age: 54
End: 2024-01-04
Payer: COMMERCIAL

## 2024-01-15 ENCOUNTER — PATIENT OUTREACH (OUTPATIENT)
Dept: CARE COORDINATION | Facility: CLINIC | Age: 54
End: 2024-01-15
Payer: COMMERCIAL

## 2024-01-18 ENCOUNTER — PATIENT OUTREACH (OUTPATIENT)
Dept: CARE COORDINATION | Facility: CLINIC | Age: 54
End: 2024-01-18
Payer: COMMERCIAL

## 2024-02-23 ENCOUNTER — HOSPITAL ENCOUNTER (OUTPATIENT)
Dept: MAMMOGRAPHY | Facility: CLINIC | Age: 54
Discharge: HOME | End: 2024-02-23
Attending: NURSE PRACTITIONER | Admitting: NURSE PRACTITIONER
Payer: COMMERCIAL

## 2024-02-23 DIAGNOSIS — Z12.31 VISIT FOR SCREENING MAMMOGRAM: ICD-10-CM

## 2024-02-23 PROCEDURE — 77063 BREAST TOMOSYNTHESIS BI: CPT

## 2024-03-10 ENCOUNTER — HEALTH MAINTENANCE LETTER (OUTPATIENT)
Age: 54
End: 2024-03-10

## 2024-04-11 ENCOUNTER — OFFICE VISIT (OUTPATIENT)
Dept: FAMILY MEDICINE | Facility: CLINIC | Age: 54
End: 2024-04-11
Payer: COMMERCIAL

## 2024-04-11 VITALS
HEART RATE: 71 BPM | TEMPERATURE: 97.5 F | OXYGEN SATURATION: 98 % | SYSTOLIC BLOOD PRESSURE: 124 MMHG | DIASTOLIC BLOOD PRESSURE: 82 MMHG | RESPIRATION RATE: 18 BRPM

## 2024-04-11 DIAGNOSIS — J01.90 ACUTE NON-RECURRENT SINUSITIS, UNSPECIFIED LOCATION: Primary | ICD-10-CM

## 2024-04-11 PROCEDURE — 99213 OFFICE O/P EST LOW 20 MIN: CPT | Performed by: FAMILY MEDICINE

## 2024-04-11 NOTE — PROGRESS NOTES
Chief Complaint   Patient presents with    Sinus Problem     On / off for 20 years     finger problem      Left ring finger - Swollen        Subjective:    Chiquita is a 53 year old female presenting with sinus symptoms:  Location: maxillary, frontal  Quality: nasal congestion, facial pain, teeth pain, and headache  Severity: moderate  Duration: 5 days  Timing: onset as cold symptoms that are steadily worsening  Modifying Factors: not better with over the counter meds  Associated Symptoms: congestion, post nasal drip, and sinus pressure      Social History:  Social History     Tobacco Use    Smoking status: Never    Smokeless tobacco: Never   Substance Use Topics    Alcohol use: Not on file       Allergies:  No Known Allergies    Review of Systems:  Constitutional:No fevers, chills, or myalgias.  Cardiac:No chest pains or palpitations  Respitatory: no hemoptysis or shortness of breath     Other than noted above, all other pertinant system review is unremarkable.    Objective:   /82   Pulse 71   Temp 97.5  F (36.4  C)   Resp 18   SpO2 98%   General: Patient is well nourished, alert and oriented in no acute distress.  Normal mood and affect.  Appropiate judgement and insight.  Eyes: normal lids and conjunctiva.  Sinus: she has tenderness of maxillary region.  Nose: normal  Throat:normal  Neck: supple without any masses or lymphadenopathy.  Normal thyroid.  Lungs: normal respiratory effort.  Clear to auscultation and percussion throughout.  Cardiac: normal S1 and S2 without any murmur, gallops or rubs.     Assessment/Plan:  Acute non-recurrent sinusitis, unspecified location  - new diagnosis - Augmentin- Recheck if not improving or worsening symptoms  - follow-up if symptoms worsen or fail to improve.    Berenice Hernandez MD

## 2024-05-04 DIAGNOSIS — G43.001 MIGRAINE WITHOUT AURA AND WITH STATUS MIGRAINOSUS, NOT INTRACTABLE: ICD-10-CM

## 2024-05-06 RX ORDER — SUMATRIPTAN 100 MG/1
TABLET, FILM COATED ORAL
Qty: 15 TABLET | Refills: 3 | Status: SHIPPED | OUTPATIENT
Start: 2024-05-06

## 2024-07-24 DIAGNOSIS — Z86.69 HISTORY OF MIGRAINE HEADACHES: ICD-10-CM

## 2024-07-24 SDOH — HEALTH STABILITY: PHYSICAL HEALTH: ON AVERAGE, HOW MANY MINUTES DO YOU ENGAGE IN EXERCISE AT THIS LEVEL?: 30 MIN

## 2024-07-24 SDOH — HEALTH STABILITY: PHYSICAL HEALTH: ON AVERAGE, HOW MANY DAYS PER WEEK DO YOU ENGAGE IN MODERATE TO STRENUOUS EXERCISE (LIKE A BRISK WALK)?: 6 DAYS

## 2024-07-24 ASSESSMENT — SOCIAL DETERMINANTS OF HEALTH (SDOH): HOW OFTEN DO YOU GET TOGETHER WITH FRIENDS OR RELATIVES?: ONCE A WEEK

## 2024-07-26 RX ORDER — AMITRIPTYLINE HYDROCHLORIDE 50 MG/1
50 TABLET ORAL AT BEDTIME
Qty: 90 TABLET | Refills: 3 | Status: SHIPPED | OUTPATIENT
Start: 2024-07-26

## 2024-07-29 ENCOUNTER — ORDERS ONLY (AUTO-RELEASED) (OUTPATIENT)
Dept: INTERNAL MEDICINE | Facility: CLINIC | Age: 54
End: 2024-07-29

## 2024-07-29 ENCOUNTER — OFFICE VISIT (OUTPATIENT)
Dept: INTERNAL MEDICINE | Facility: CLINIC | Age: 54
End: 2024-07-29
Payer: COMMERCIAL

## 2024-07-29 VITALS
DIASTOLIC BLOOD PRESSURE: 84 MMHG | RESPIRATION RATE: 18 BRPM | WEIGHT: 134 LBS | HEART RATE: 76 BPM | TEMPERATURE: 97.6 F | HEIGHT: 62 IN | SYSTOLIC BLOOD PRESSURE: 120 MMHG | OXYGEN SATURATION: 98 % | BODY MASS INDEX: 24.66 KG/M2

## 2024-07-29 DIAGNOSIS — Z00.00 ENCOUNTER FOR ANNUAL PHYSICAL EXAM: Primary | ICD-10-CM

## 2024-07-29 DIAGNOSIS — Z13.1 SCREENING FOR DIABETES MELLITUS: ICD-10-CM

## 2024-07-29 DIAGNOSIS — Z12.11 SCREEN FOR COLON CANCER: ICD-10-CM

## 2024-07-29 DIAGNOSIS — G43.009 MIGRAINE WITHOUT AURA AND WITHOUT STATUS MIGRAINOSUS, NOT INTRACTABLE: ICD-10-CM

## 2024-07-29 DIAGNOSIS — Z13.220 LIPID SCREENING: ICD-10-CM

## 2024-07-29 DIAGNOSIS — Z12.83 SCREENING FOR SKIN CANCER: ICD-10-CM

## 2024-07-29 LAB
CHOLEST SERPL-MCNC: 189 MG/DL
FASTING STATUS PATIENT QL REPORTED: YES
HBA1C MFR BLD: 5 % (ref 0–5.6)
HDLC SERPL-MCNC: 66 MG/DL
LDLC SERPL CALC-MCNC: 112 MG/DL
NONHDLC SERPL-MCNC: 123 MG/DL
TRIGL SERPL-MCNC: 53 MG/DL

## 2024-07-29 PROCEDURE — 80061 LIPID PANEL: CPT | Performed by: NURSE PRACTITIONER

## 2024-07-29 PROCEDURE — 83036 HEMOGLOBIN GLYCOSYLATED A1C: CPT | Performed by: NURSE PRACTITIONER

## 2024-07-29 PROCEDURE — 99213 OFFICE O/P EST LOW 20 MIN: CPT | Mod: 25 | Performed by: NURSE PRACTITIONER

## 2024-07-29 PROCEDURE — 99396 PREV VISIT EST AGE 40-64: CPT | Performed by: NURSE PRACTITIONER

## 2024-07-29 PROCEDURE — 36415 COLL VENOUS BLD VENIPUNCTURE: CPT | Performed by: NURSE PRACTITIONER

## 2024-07-29 RX ORDER — LACTOBACILLUS RHAMNOSUS GG 10B CELL
1 CAPSULE ORAL
COMMUNITY

## 2024-07-29 NOTE — PROGRESS NOTES
Prevent[facility] Care Visit  [facility],    Jul 29, 2024      Assessment & Plan     Encounter for annual physical exam  Patient is in overall good health.  She has had a 50 pound weight loss and I congratulated her on this.  Due to family history of breast cancer she gets yearly mammograms.  Last mammogram was 2/20/2024.  Will be due for mammogram again in 2025.  Due for Pap in 2027.   - REVIEW OF HEALTH MAINTENANCE PROTOCOL ORDERS    Migraine without aura and without status migrainosus, not intractable  Currently managed with amitriptyline 50 mg daily and sumatriptan as needed.  No acute concerns today.    Screen for colon cancer  Would prefer to do Cologuard testing.  Discussed Cologuard testing in detail.  Will need rescreening every 3 years.  - COLOGUARD([employer]); Future    Lipid screening    - Lipid Profile; Future    Screening for diabetes mellitus    - Hemoglobin A1c; Future    Screening for skin cancer  Patient reports that she has multiple freckles and some spots that have shown up as she has gotten older.  Would like to see dermatology for full skin check.  Referral was placed.   - Adult Dermatology  Referral; Future            Counseling  Appropriate preventive services were addressed with this patient via screening, questionnaire, or discussion as appropriate for fall prevention, nutrition, physical activity, Tobacco-use cessation, weight loss and cognition.  Checklist reviewing preventive services available has been given to the patient.  Reviewed patient's diet, addressing concerns and/or questions.   She is at risk for psychosocial distress and has been provided with information to reduce risk.           Roque Porras is a 53 year old, presenting for the following:  Physical (Pt is fasting)        7/29/2024     8:59 AM   Additional Questions   Roomed by Cat BHANDARI        Health Care Directive  Patient does not have a Health Care Directive or  Living Will: Discussed advance care planning with patient; however, patient declined at this time.    FARHANA Porras is a very pleasant 53-year-old female here today for an annual exam.  She has no acute concerns today.  Is due for colonoscopy but would like to do the Cologuard testing.  Reports a family history of breast cancer and gets annual mammograms.  She already had hers earlier this year.  Has a history of migraines which is controlled with amitriptyline daily and uses sumatriptan as needed for abortive therapy.  Would like to see dermatology for full skin check due to having multiple freckles and moles.  No other acute concerns today.            7/24/2024   General Health   How would you rate your overall physical health? Good   Feel stress (tense, anxious, or unable to sleep) Only a little      (!) STRESS CONCERN      7/24/2024   Nutrition   Three or more servings of calcium each day? Yes   Diet: Other   If other, please elaborate: [place]   How many servings of fruit and vegetables per day? 4 or more   How many sweetened beverages each day? 0-1            7/24/2024   Exercise   Days per week of moderate/strenous exercise 6 days   Average minutes spent exercising at this level 30 min            7/24/2024   Social Factors   Frequency of gathering with friends or relatives Once a week   Worry food won't last until get money to buy more No   Food not last or not have enough money for food? No   Do you have housing? (Housing is defined as stable permanent housing and does not include staying ouside in a car, in a tent, in an abandoned building, in an overnight shelter, or couch-surfing.) Yes   Are you worried about losing your housing? No   Lack of transportation? No   Unable to get utilities (heat,electricity)? No            7/24/2024   Fall Risk   Fallen 2 or more times in the past year? No   Trouble with walking or balance? No             7/24/2024   Dental   Dentist two times every year? Yes             7/24/2024   TB Screening   Were you born outside of the [place]? No            Today's PHQ-2 Score:       7/29/2024     8:53 AM   PHQ-2 ( 1999 [employer])   Q1: Little interest or pleasure in doing things 0   Q2: Feeling down, depressed or hopeless 0   PHQ-2 Score 0   Q1: Little interest or pleasure in doing things Not at all   Q2: Feeling down, depressed or hopeless Not at all   PHQ-2 Score 0           7/24/2024   Substance Use   Alcohol more than 3/day or more than 7/wk No   Do you use any other substances recreationally? No        Social History     Tobacco Use    Smoking status: Never     Passive exposure: Never    Smokeless tobacco: Never   Vaping Use    Vaping status: Never Used           2/23/2024   LAST FHS-7 RESULTS   1st degree relative breast or ovarian cancer Yes   Any relative bilateral breast cancer No   Any male have breast cancer No   Any ONE woman have BOTH breast AND ovarian cancer No   Any woman with breast cancer before 50yrs No   2 or more relatives with breast AND/OR ovarian cancer Yes   2 or more relatives with breast AND/OR bowel cancer Yes           Mammogram Screening - Annual screen due to genetic mutation or genetic mutation in 1st degree family member        7/24/2024   STI Screening   New sexual partner(s) since last STI/HIV test? No        History of abnormal Pap smear: No - age 30- 64 PAP with HPV every 5 years recommended       ASCVD Risk   The 10-year ASCVD risk score (Sarina CARMONA, et al., 2019) is: 1.4%    Values used to calculate the score:      Age: 53 years      Sex: Female      Is Non- : No      Diabetic: No      Tobacco smoker: No      Systolic Blood Pressure: 120 mmHg      Is BP treated: No      HDL Cholesterol: 56 mg/dL      Total Cholesterol: 213 mg/dL           Reviewed and updated as needed this visit by Provider                  ROS  Comprehensive 12-point review of systems was completed and negative except as noted in HPI.       Objective   "  Exam  /84 (BP Location: Right arm)   Pulse 76   Temp 97.6  F (36.4  C)   Resp 18   Ht 1.562 m (5' 1.5\")   Wt 60.8 kg (134 lb)   LMP  (LMP Unknown)   SpO2 98%   BMI 24.91 kg/m     Estimated body mass index is 24.91 kg/m  as calculated from the following:    Height as of this encounter: 1.562 m (5' 1.5\").    Weight as of this encounter: 60.8 kg (134 lb).    Physical Exam  Constitutional: In no acute distress.  Clean appearance.  Eyes: PERRLA.  EOMI.  No conjunctival redness.  Ears: Bilateral TMs are intact without any erythema or effusion.  Grossly normal hearing.  Nose: Nares patent bilaterally.  Normal mucosa.  Oropharynx: Normal mucosa.  Dentition and gingiva is appropriate.  Posterior oropharynx without any abnormalities.  Neck: Supple.  Trachea is midline.  No thyromegaly.  Neck is without tenderness or masses.  Cardiovascular: Regular rate and rhythm.  Normal peripheral perfusion.  No edema.  No varicosities.  Respiratory: Lungs are clear bilaterally.  Normal respiratory effort.  Skin: Skin is without significant rashes or lesions.  No suspicious moles.  Gastrointestinal: Soft and flat.  Normal bowel sounds.  Nontender throughout upon palpation.  No organomegaly or masses.  Negative for CVA tenderness.  Musculoskeletal: Extremities without any cyanosis or clubbing.  No joint swelling or deformities.  Normal range of motion of extremities.  Gait normal.  Able to mount exam table without difficulties.  Psychiatric: Appropriate affect and demeanor.  Memory intact.  Good insight and judgment.  Neurologic: Sensation and temperature of extremities appropriate.  No tremor or involuntary movement noted.          Signed Electronically by: Shelby Mena NP    "

## 2024-07-29 NOTE — PATIENT INSTRUCTIONS
You are due to the shingles vaccines called shingrix. This is typically not covered to receive here at the clinic, but insurance covers to have completed at the pharmacy. I would recommend checking with your insurance company to see where this should be completed.    Fasting labs obtained today.  Will notify you of these results through [employer] once available.    Referral for dermatology placed.  They should call you within 2 business days to help schedule an appointment.    They will mail you a kit for the Cologuard test.  This will screen for colorectal cancer.  They will notify you directly with results.  If normal repeat testing in 3 years.      Follow-up in 1 year for annual exam.

## 2024-08-08 LAB — NONINV COLON CA DNA+OCC BLD SCRN STL QL: NEGATIVE

## 2025-02-24 ENCOUNTER — HOSPITAL ENCOUNTER (OUTPATIENT)
Dept: MAMMOGRAPHY | Facility: CLINIC | Age: 55
Discharge: HOME | End: 2025-02-24
Attending: NURSE PRACTITIONER | Admitting: NURSE PRACTITIONER
Payer: COMMERCIAL

## 2025-02-24 DIAGNOSIS — Z12.31 VISIT FOR SCREENING MAMMOGRAM: ICD-10-CM

## 2025-02-24 PROCEDURE — 77063 BREAST TOMOSYNTHESIS BI: CPT

## 2025-03-17 ENCOUNTER — TELEPHONE (OUTPATIENT)
Dept: DERMATOLOGY | Facility: CLINIC | Age: 55
End: 2025-03-17
Payer: COMMERCIAL

## 2025-03-17 NOTE — TELEPHONE ENCOUNTER
Patient Details  Patient's Full Name  Chiquita Baca  Patient's Date of Birth  1970  Patient's Phone Number  [phone]  Patient's Email ID  [email or web address]  Has the patient visited [place] in the past 3 years?  Yes  Address Details  Address  [address] [place] [address]   [id number]  Appointment Preferences  Reason for appointment  Body scan for skin issues  Preferred Provider  Kathy Newell  Preferred Location  [facility]  Preferred Visit Type  In-person   Services   Do you need an  for your appointment?  No  Billing Preference  Which billing situation applies to the patient?  Patient has insurance  Insurance Information  Insurance Provider Name  [facility]  Member ID/ Policy Number  [id number]  Group Number  [id number]  Insurance Contact for Provider  [phone]  Policy [place]  Is the patient the Insurance Policy ortiz/subscriber?  Yes, patient is the policy ortiz  Callback Preferences  Could you share your preferred callback time with us?  No, I don't have a preference

## 2025-06-30 ENCOUNTER — PATIENT OUTREACH (OUTPATIENT)
Dept: CARE COORDINATION | Facility: CLINIC | Age: 55
End: 2025-06-30
Payer: COMMERCIAL

## 2025-07-14 ENCOUNTER — PATIENT OUTREACH (OUTPATIENT)
Dept: CARE COORDINATION | Facility: CLINIC | Age: 55
End: 2025-07-14
Payer: COMMERCIAL

## 2025-08-31 ENCOUNTER — HEALTH MAINTENANCE LETTER (OUTPATIENT)
Age: 55
End: 2025-08-31